# Patient Record
Sex: MALE | Race: BLACK OR AFRICAN AMERICAN | Employment: OTHER | URBAN - METROPOLITAN AREA
[De-identification: names, ages, dates, MRNs, and addresses within clinical notes are randomized per-mention and may not be internally consistent; named-entity substitution may affect disease eponyms.]

---

## 2021-11-05 ENCOUNTER — OFFICE VISIT (OUTPATIENT)
Dept: ONCOLOGY | Age: 61
End: 2021-11-05
Payer: MEDICARE

## 2021-11-05 VITALS
HEART RATE: 81 BPM | DIASTOLIC BLOOD PRESSURE: 93 MMHG | OXYGEN SATURATION: 98 % | BODY MASS INDEX: 22.97 KG/M2 | HEIGHT: 74 IN | TEMPERATURE: 97.3 F | SYSTOLIC BLOOD PRESSURE: 141 MMHG | WEIGHT: 179 LBS | RESPIRATION RATE: 18 BRPM

## 2021-11-05 DIAGNOSIS — D53.9 MACROCYTIC ANEMIA: Primary | ICD-10-CM

## 2021-11-05 DIAGNOSIS — D64.9 CHRONIC ANEMIA: ICD-10-CM

## 2021-11-05 PROCEDURE — 99204 OFFICE O/P NEW MOD 45 MIN: CPT | Performed by: INTERNAL MEDICINE

## 2021-11-05 RX ORDER — LANOLIN ALCOHOL/MO/W.PET/CERES
400 CREAM (GRAM) TOPICAL DAILY
COMMUNITY

## 2021-11-05 NOTE — PROGRESS NOTES
Hematology/Oncology Consultation Note      Date: 2021    Name: Alma Oreilly  : 1960        Estrada Killian MD         Subjective:     Chief complaint: anemia    History of Present Illness:   Mr. Imelda Tellez is a most pleasant 57y.o. year old male who was seen for consultation of anemia. The patient has a past medical history significant of thrombocytopenia and refractory anemia, questionable myelodysplastic syndrome. Patient was being followed by Dr. Betina Brunner who retired, last seen 2016.  2021 CBC reported hemoglobin 10, hematocrit 30.3%, WBC 4.4, , and platelet 032,563. He has been doing reasonably well. The patient otherwise has no other complaints. Denied fever, chills, night sweat, unintentional weight loss, skin lumps or bumps, acute bleeding or bruising issues. Denied headache, acute vision change, dizziness, chest pain, worsen shortness of breath, palpitation, productive cough, nausea, vomiting, abdominal pain, altered bowel habits, dysuria, worsen bone pain or back pain, new focal numbness or weakness. Past Medical History, Family History, and Social History:    Past Medical History:   Diagnosis Date    Anemia     GERD (gastroesophageal reflux disease)     Seizures (HCC)     Stool color black     Thyroid disease      No past surgical history on file. Social History     Socioeconomic History    Marital status:      Spouse name: Not on file    Number of children: Not on file    Years of education: Not on file    Highest education level: Not on file   Occupational History    Not on file   Tobacco Use    Smoking status: Current Some Day Smoker    Smokeless tobacco: Never Used    Tobacco comment: while drinking   Substance and Sexual Activity    Alcohol use:  Yes     Alcohol/week: 4.2 standard drinks     Types: 5 Cans of beer per week     Comment: 4-5 beers a few times a week    Drug use: No    Sexual activity: Not on file   Other Topics Concern    Not on file   Social History Narrative    Not on file     Social Determinants of Health     Financial Resource Strain:     Difficulty of Paying Living Expenses: Not on file   Food Insecurity:     Worried About Running Out of Food in the Last Year: Not on file    Joanne of Food in the Last Year: Not on file   Transportation Needs:     Lack of Transportation (Medical): Not on file    Lack of Transportation (Non-Medical): Not on file   Physical Activity:     Days of Exercise per Week: Not on file    Minutes of Exercise per Session: Not on file   Stress:     Feeling of Stress : Not on file   Social Connections:     Frequency of Communication with Friends and Family: Not on file    Frequency of Social Gatherings with Friends and Family: Not on file    Attends Rastafarian Services: Not on file    Active Member of [address] [place] [address] [employer] or Organizations: Not on file    Attends Club or Organization Meetings: Not on file    Marital Status: Not on file   Intimate Partner Violence:     Fear of Current or Ex-Partner: Not on file    Emotionally Abused: Not on file    Physically Abused: Not on file    Sexually Abused: Not on file   Housing Stability:     Unable to Pay for Housing in the Last Year: Not on file    Number of [place] in the Last Year: Not on file    Unstable Housing in the Last Year: Not on file     No family history on file. Current Outpatient Medications   Medication Sig Dispense Refill    magnesium oxide (MAG-OX) 400 mg tablet Take 400 mg by mouth daily.  levETIRAcetam (KEPPRA) 500 mg tablet Take  by mouth two (2) times a day.  esomeprazole (NEXIUM) 40 mg capsule Take  by mouth daily.  levothyroxine (SYNTHROID) 50 mcg tablet Take  by mouth Daily (before breakfast).  Calcium Carbonate-Vit D3-Min (CALTRATE 600+D PLUS MINERALS) 600 mg calcium- 400 unit tab Take  by mouth. (Patient not taking: Reported on 11/5/2021)      zonisamide (ZONEGRAN) 100 mg capsule Take  by mouth daily.  (Patient not taking: Reported on 11/5/2021)      omega-3 fatty acids-vitamin e (FISH OIL) 1,000 mg cap Take 1 Cap by mouth. (Patient not taking: Reported on 11/5/2021)         Review of Systems   Constitutional: Positive for malaise/fatigue. Negative for chills, diaphoresis, fever and weight loss. Respiratory: Negative for cough, hemoptysis, shortness of breath and wheezing. Cardiovascular: Negative for chest pain, palpitations and leg swelling. Gastrointestinal: Negative for abdominal pain, diarrhea, heartburn, nausea and vomiting. Genitourinary: Negative for dysuria, frequency, hematuria and urgency. Musculoskeletal: Negative for joint pain and myalgias. Skin: Negative for itching and rash. Neurological: Negative for dizziness, seizures, weakness and headaches. Psychiatric/Behavioral: Negative for depression. The patient does not have insomnia. Objective:     Visit Vitals  BP (!) 141/93 (BP 1 Location: Right arm, BP Patient Position: Sitting, BP Cuff Size: Adult)   Pulse 81   Temp 97.3 °F (36.3 °C) (Temporal)   Resp 18   Ht 6' 2\" (1.88 m)   Wt 81.2 kg (179 lb)   SpO2 98%   BMI 22.98 kg/m²       ECOG Performance Status (grade): 1  0 - able to carry on all pre-disease activity w/out restriction  1 - restricted but able to carry out light work  2 - ambulatory and can self- care but unable to carry out work  3 - bed or chair >50% of waking hours  4 - completely disable, total care, confined to bed or chair    Physical Exam  Constitutional:       General: He is not in acute distress. HENT:      Head: Normocephalic and atraumatic. Eyes:      Pupils: Pupils are equal, round, and reactive to light. Cardiovascular:      Pulses: Normal pulses. Heart sounds: Normal heart sounds. No murmur heard. Pulmonary:      Effort: Pulmonary effort is normal. No respiratory distress. Breath sounds: Normal breath sounds. Abdominal:      General: Bowel sounds are normal. There is no distension. Palpations: Abdomen is soft. There is no mass. Tenderness: There is no abdominal tenderness. There is no guarding. Musculoskeletal:         General: No swelling or tenderness. Cervical back: Neck supple. No rigidity. Lymphadenopathy:      Cervical: No cervical adenopathy. Skin:     General: Skin is warm. Findings: No rash. Neurological:      Mental Status: He is alert and oriented to person, place, and time. Mental status is at baseline. Cranial Nerves: No cranial nerve deficit. Psychiatric:         Mood and Affect: Mood normal.          Diagnostics:      No results found for this or any previous visit (from the past 96 hour(s)). Imaging:  No results found for this or any previous visit. No results found for this or any previous visit. No results found for this or any previous visit. Pathology          Assessment:                                        1. Macrocytic anemia    2. Chronic anemia        Plan:                                        # Macrocytic Anemia  # Chronic Anemia   -- Past medical history significant of thrombocytopenia and refractory anemia, questionable myelodysplastic syndrome. -- Patient was being followed by Dr. Feliciano Tolbert who retired, last seen 1/2016.  -- 8/19/2021 CBC reported hemoglobin 10, hematocrit 30.3%, WBC 4.4, , and platelet 533,098.  -- Today I have reviewed with the patient about above lab reports. Plan:  -- Lab checks: CBC, chemistry, iron profiles, ferritin, erythropoietin level, B12, folate, MMA, reticulocyte count, LDH/haptoglobin, smear review, and SPEP/SFLC/SHERMAN.  -- The patient will be notified if any interventions is warranted. Further workup will be guided by results from aforementioned initial study. -- We will see the patient back in about 3 weeks. Always sooner if required.         Orders Placed This Encounter    METABOLIC PANEL, COMPREHENSIVE     Standing Status:   Future     Standing Expiration Date: 11/6/2022    IRON PROFILE     Standing Status:   Future     Standing Expiration Date:   11/6/2022    FERRITIN     Standing Status:   Future     Standing Expiration Date:   11/5/2022    VITAMIN B12 & FOLATE     Standing Status:   Future     Standing Expiration Date:   11/5/2022    RETICULOCYTE COUNT     Standing Status:   Future     Standing Expiration Date:   11/5/2022    ERYTHROPOIETIN     Standing Status:   Future     Standing Expiration Date:   11/5/2022    SED RATE (ESR)     Standing Status:   Future     Standing Expiration Date:   11/5/2022    COPPER     Standing Status:   Future     Standing Expiration Date:   11/5/2022    PERIPHERAL SMEAR     Standing Status:   Future     Standing Expiration Date:   11/5/2022    CBC WITH AUTOMATED DIFF     Standing Status:   Future     Standing Expiration Date:   11/5/2022    LD     Standing Status:   Future     Standing Expiration Date:   11/5/2022    HAPTOGLOBIN     Standing Status:   Future     Standing Expiration Date:   11/5/2022    GAMMOPATHY EVAL, SPEP/SHERMAN, IG QT/FLC     Standing Status:   Future     Standing Expiration Date:   11/5/2022    magnesium oxide (MAG-OX) 400 mg tablet     Sig: Take 400 mg by mouth daily. Mr. Harmeet Pulliam has a reminder for a \"due or due soon\" health maintenance. I have asked that he contact his primary care provider for follow-up on this health maintenance. All of patient's questions answered to their apparent satisfaction. They verbally show understanding and agreement with aforementioned plan. I would like to thank Dr Clifton Clemons MD  for allowing me to participate in the care of this very pleasant patient. If I can be of further assistance please do not hesitate to call.          Elin Montgomery MD  11/5/2021              Above mentioned total time spent for this encounter with more than 50% of the time spent in face-to-face counseling, discussing on diagnosis and management plan going forward, and co-ordination of care. Parts of this document has been produced using Dragon dictation system. Unrecognized errors in transcription may be present. Please do not hesitate to reach out for any questions or clarifications.       CC: Jj Flores MD

## 2021-11-10 LAB
ALBUMIN SERPL ELPH-MCNC: 3.8 G/DL (ref 2.9–4.4)
ALBUMIN SERPL-MCNC: 4.2 G/DL (ref 3.8–4.8)
ALBUMIN/GLOB SERPL: 1 {RATIO} (ref 0.7–1.7)
ALBUMIN/GLOB SERPL: 1.1 {RATIO} (ref 1.2–2.2)
ALP SERPL-CCNC: 109 IU/L (ref 44–121)
ALPHA1 GLOB SERPL ELPH-MCNC: 0.1 G/DL (ref 0–0.4)
ALPHA2 GLOB SERPL ELPH-MCNC: 0.8 G/DL (ref 0.4–1)
ALT SERPL-CCNC: 11 IU/L (ref 0–44)
AST SERPL-CCNC: 24 IU/L (ref 0–40)
B-GLOBULIN SERPL ELPH-MCNC: 1.3 G/DL (ref 0.7–1.3)
BASOPHILS # BLD AUTO: 0.1 X10E3/UL (ref 0–0.2)
BASOPHILS NFR BLD AUTO: 3 %
BILIRUB SERPL-MCNC: 0.3 MG/DL (ref 0–1.2)
BUN SERPL-MCNC: 10 MG/DL (ref 8–27)
BUN/CREAT SERPL: 11 (ref 10–24)
CALCIUM SERPL-MCNC: 9.1 MG/DL (ref 8.6–10.2)
CHLORIDE SERPL-SCNC: 101 MMOL/L (ref 96–106)
CO2 SERPL-SCNC: 18 MMOL/L (ref 20–29)
COPPER SERPL-MCNC: 83 UG/DL (ref 69–132)
CREAT SERPL-MCNC: 0.87 MG/DL (ref 0.76–1.27)
EOSINOPHIL # BLD AUTO: 0.1 X10E3/UL (ref 0–0.4)
EOSINOPHIL NFR BLD AUTO: 3 %
EPO SERPL-ACNC: 12.1 MIU/ML (ref 2.6–18.5)
ERYTHROCYTE [DISTWIDTH] IN BLOOD BY AUTOMATED COUNT: 13.6 % (ref 11.6–15.4)
ERYTHROCYTE [SEDIMENTATION RATE] IN BLOOD BY WESTERGREN METHOD: 36 MM/HR (ref 0–30)
FERRITIN SERPL-MCNC: 131 NG/ML (ref 30–400)
FOLATE SERPL-MCNC: 3.2 NG/ML
GAMMA GLOB SERPL ELPH-MCNC: 2 G/DL (ref 0.4–1.8)
GLOBULIN SER CALC-MCNC: 3.8 G/DL (ref 1.5–4.5)
GLOBULIN SER-MCNC: 4.2 G/DL (ref 2.2–3.9)
GLUCOSE SERPL-MCNC: 94 MG/DL (ref 65–99)
HAPTOGLOB SERPL-MCNC: 139 MG/DL (ref 32–363)
HCT VFR BLD AUTO: 37.4 % (ref 37.5–51)
HGB BLD-MCNC: 13 G/DL (ref 13–17.7)
IGA SERPL-MCNC: 767 MG/DL (ref 61–437)
IGG SERPL-MCNC: 1793 MG/DL (ref 603–1613)
IGM SERPL-MCNC: 61 MG/DL (ref 20–172)
IMM GRANULOCYTES # BLD AUTO: 0 X10E3/UL (ref 0–0.1)
IMM GRANULOCYTES NFR BLD AUTO: 0 %
INTERPRETATION SERPL IEP-IMP: ABNORMAL
IRON SATN MFR SERPL: 38 % (ref 15–55)
IRON SERPL-MCNC: 108 UG/DL (ref 38–169)
KAPPA LC FREE SER-MCNC: 36.9 MG/L (ref 3.3–19.4)
KAPPA LC FREE/LAMBDA FREE SER: 1.54 {RATIO} (ref 0.26–1.65)
LAMBDA LC FREE SERPL-MCNC: 23.9 MG/L (ref 5.7–26.3)
LDH SERPL-CCNC: 145 IU/L (ref 121–224)
LYMPHOCYTES # BLD AUTO: 1.5 X10E3/UL (ref 0.7–3.1)
LYMPHOCYTES NFR BLD AUTO: 52 %
M PROTEIN SERPL ELPH-MCNC: ABNORMAL G/DL
MCH RBC QN AUTO: 32.5 PG (ref 26.6–33)
MCHC RBC AUTO-ENTMCNC: 34.8 G/DL (ref 31.5–35.7)
MCV RBC AUTO: 94 FL (ref 79–97)
MONOCYTES # BLD AUTO: 0.4 X10E3/UL (ref 0.1–0.9)
MONOCYTES NFR BLD AUTO: 15 %
MORPHOLOGY BLD-IMP: ABNORMAL
NEUTROPHILS # BLD AUTO: 0.8 X10E3/UL (ref 1.4–7)
NEUTROPHILS NFR BLD AUTO: 27 %
PATH INTERP BLD-IMP: ABNORMAL
PATH REV BLD -IMP: ABNORMAL
PATHOLOGIST NAME: ABNORMAL
PLATELET # BLD AUTO: 157 X10E3/UL (ref 150–450)
PLEASE NOTE:, 149534: ABNORMAL
POTASSIUM SERPL-SCNC: 4.2 MMOL/L (ref 3.5–5.2)
PROT SERPL-MCNC: 8 G/DL (ref 6–8.5)
RBC # BLD AUTO: 4 X10E6/UL (ref 4.14–5.8)
RETICS/RBC NFR AUTO: 7.2 % (ref 0.6–2.6)
SODIUM SERPL-SCNC: 137 MMOL/L (ref 134–144)
TIBC SERPL-MCNC: 286 UG/DL (ref 250–450)
UIBC SERPL-MCNC: 178 UG/DL (ref 111–343)
VIT B12 SERPL-MCNC: 262 PG/ML (ref 232–1245)
WBC # BLD AUTO: 2.8 X10E3/UL (ref 3.4–10.8)

## 2021-11-30 ENCOUNTER — VIRTUAL VISIT (OUTPATIENT)
Dept: ONCOLOGY | Age: 61
End: 2021-11-30

## 2021-11-30 DIAGNOSIS — D64.9 CHRONIC ANEMIA: ICD-10-CM

## 2021-11-30 DIAGNOSIS — D70.9 NEUTROPENIA, UNSPECIFIED TYPE (HCC): ICD-10-CM

## 2021-11-30 DIAGNOSIS — D53.9 MACROCYTIC ANEMIA: Primary | ICD-10-CM

## 2021-12-08 ENCOUNTER — DOCUMENTATION ONLY (OUTPATIENT)
Dept: ONCOLOGY | Age: 61
End: 2021-12-08

## 2021-12-08 NOTE — PROGRESS NOTES
Patient no showed 3 week virtual appt 11/30/2021 tried contacting patient get fast busy signal.      Patient lvm 12/07/2021 believes he missed phone appt or needs to get correct date call [phone]. Tried calling back [phone] still get fast busy signal even tried using area code and 1+area code. Tried calling emergency contact no vm set up.

## 2022-01-03 ENCOUNTER — VIRTUAL VISIT (OUTPATIENT)
Dept: ONCOLOGY | Age: 62
End: 2022-01-03
Payer: MEDICARE

## 2022-01-03 DIAGNOSIS — E53.8 B12 DEFICIENCY: Primary | ICD-10-CM

## 2022-01-03 DIAGNOSIS — D70.9 NEUTROPENIA, UNSPECIFIED TYPE (HCC): ICD-10-CM

## 2022-01-03 DIAGNOSIS — D53.9 MACROCYTIC ANEMIA: ICD-10-CM

## 2022-01-03 DIAGNOSIS — D64.9 CHRONIC ANEMIA: ICD-10-CM

## 2022-01-03 PROCEDURE — 99442 PR PHYS/QHP TELEPHONE EVALUATION 11-20 MIN: CPT | Performed by: INTERNAL MEDICINE

## 2022-01-03 NOTE — PROGRESS NOTES
Sarah Kraus is a 57 y.o. male, evaluated via audio-only technology on 1/3/2022 for Follow-up  . Assessment & Plan:   Diagnoses and all orders for this visit:    1. B12 deficiency  -     METABOLIC PANEL, COMPREHENSIVE; Future  -     VITAMIN B12 & FOLATE; Future  -     CBC WITH AUTOMATED DIFF; Future  -     METHYLMALONIC ACID; Future    2. Macrocytic anemia    3. Chronic anemia    4. Neutropenia, unspecified type ([address]  -     METABOLIC PANEL, COMPREHENSIVE; Future  -     VITAMIN B12 & FOLATE; Future  -     CBC WITH AUTOMATED DIFF; Future  -     METHYLMALONIC ACID; Future      # B12 deficiency anemia  # Macrocytic Anemia  # Chronic Anemia   # Neutropenia  -- Past medical history significant of thrombocytopenia and refractory anemia, questionable myelodysplastic syndrome. -- Patient was being followed by Dr. Verónica Coburn who retired, last seen 1/2016.  -- 8/19/2021 CBC reported hemoglobin 10, hematocrit 30.3%, WBC 4.4, , and platelet 740,467.  -- Today I have reviewed with the patient about above lab reports. 11/5/2021 CBC reported hemoglobin 13, hematocrit 37.4%, total WBC 2.8, ANC 0.8, platelet 256,096. ESR was 36, vitamin B12 262, , haptoglobin 139, iron saturation 38%, ferritin 131. We have discussed about potential BMBx if no clear etiology of cytopenia, he reported he has transportation issues to [facility] for BMBx procedures. We have discussed about B12 replacement, the patient was agreeable with the plan. Plan:  -- His neutropenia and macrocytic anemia could be related to B12 deficiency. Will plan to start him on Vit B12 1000 mcg injection once weekly for one month followed by 1000 mcg monthly. -- Continue lab checks CBC with diff, B12, folate, MMA, reticulocyte count. -- If persistent cytopenias will consider to obtain BMBx  -- Advised the patient to seek a prompt medical attention if any fever, recurrent infections, skin rashes, or any concerns.    -- We will see the patient back in about 3 months. Always sooner if required. 12  Subjective:   Mr. Evaristo Johnson is a most pleasant 57y.o. year old male who was seen for consultation of anemia. The patient has a past medical history significant of thrombocytopenia and refractory anemia, questionable myelodysplastic syndrome. Patient was being followed by Dr. Ronnie Lilly who retired, last seen 1/2016.  8/19/2021 CBC reported hemoglobin 10, hematocrit 30.3%, WBC 4.4, , and platelet 395,788. Since last visit he reported he has been doing reasonably well. The patient otherwise has no other complaints. Denied fever, chills, night sweat, unintentional weight loss, skin lumps or bumps, acute bleeding or bruising issues. Denied headache, acute vision change, dizziness, chest pain, worsen shortness of breath, palpitation, productive cough, nausea, vomiting, abdominal pain, altered bowel habits, dysuria, worsen bone pain or back pain, new focal numbness or weakness. Prior to Admission medications    Medication Sig Start Date End Date Taking? Authorizing Provider   magnesium oxide (MAG-OX) 400 mg tablet Take 400 mg by mouth daily. Provider, Historical   Calcium Carbonate-Vit D3-Min (CALTRATE 600+D PLUS MINERALS) 600 mg calcium- 400 unit tab Take  by mouth. Patient not taking: Reported on 11/5/2021    Provider, Historical   zonisamide (ZONEGRAN) 100 mg capsule Take  by mouth daily. Patient not taking: Reported on 11/5/2021    Provider, Historical   levETIRAcetam (KEPPRA) 500 mg tablet Take  by mouth two (2) times a day. Provider, Historical   esomeprazole (NEXIUM) 40 mg capsule Take  by mouth daily. Provider, Historical   levothyroxine (SYNTHROID) 50 mcg tablet Take  by mouth Daily (before breakfast). Provider, Historical   omega-3 fatty acids-vitamin e (FISH OIL) 1,000 mg cap Take 1 Cap by mouth.   Patient not taking: Reported on 11/5/2021    Provider, Historical     Patient Active Problem List   Diagnosis Code    Refractory anemia ([facility] D46.4    MDS (myelodysplastic syndrome) ([facility]) D46.9       Review of Systems   Constitutional: Negative for chills, diaphoresis, fever, malaise/fatigue and weight loss. Respiratory: Negative for cough, hemoptysis, shortness of breath and wheezing. Cardiovascular: Negative for chest pain, palpitations and leg swelling. Gastrointestinal: Negative for abdominal pain, diarrhea, heartburn, nausea and vomiting. Genitourinary: Negative for dysuria, frequency, hematuria and urgency. Musculoskeletal: Negative for joint pain and myalgias. Skin: Negative for itching and rash. Neurological: Negative for dizziness, seizures, weakness and headaches. Psychiatric/Behavioral: Negative for depression. The patient does not have insomnia. Patient-Reported Vitals 1/3/2022   Patient-Reported Weight 178lbs       Lars Lowery, who was evaluated through a patient-initiated, synchronous (real-time) audio only encounter, and/or her healthcare decision maker, is aware that it is a billable service, with coverage as determined by his insurance carrier. He provided verbal consent to proceed: Yes. He has not had a related appointment within my department in the past 7 days or scheduled within the next 24 hours. On this date 01/03/2022 I have spent 20 minutes reviewing previous notes, test results and face to face (virtual) with the patient discussing the diagnosis and importance of compliance with the treatment plan as well as documenting on the day of the visit.     Erich Greene MD

## 2022-02-07 PROBLEM — D51.9 VITAMIN B12 DEFICIENCY ANEMIA: Status: ACTIVE | Noted: 2022-02-07

## 2022-02-07 RX ORDER — HYDROCORTISONE SODIUM SUCCINATE 100 MG/2ML
100 INJECTION, POWDER, FOR SOLUTION INTRAMUSCULAR; INTRAVENOUS AS NEEDED
Status: CANCELLED | OUTPATIENT
Start: 2022-02-11

## 2022-02-07 RX ORDER — DIPHENHYDRAMINE HYDROCHLORIDE 50 MG/ML
50 INJECTION, SOLUTION INTRAMUSCULAR; INTRAVENOUS AS NEEDED
Status: CANCELLED
Start: 2022-02-11

## 2022-02-07 RX ORDER — ACETAMINOPHEN 325 MG/1
650 TABLET ORAL AS NEEDED
Status: CANCELLED
Start: 2022-02-11

## 2022-02-07 RX ORDER — EPINEPHRINE 1 MG/ML
0.3 INJECTION, SOLUTION, CONCENTRATE INTRAVENOUS AS NEEDED
Status: CANCELLED | OUTPATIENT
Start: 2022-02-11

## 2022-02-07 RX ORDER — ALBUTEROL SULFATE 0.83 MG/ML
2.5 SOLUTION RESPIRATORY (INHALATION) AS NEEDED
Status: CANCELLED
Start: 2022-02-11

## 2022-02-07 RX ORDER — DIPHENHYDRAMINE HYDROCHLORIDE 50 MG/ML
25 INJECTION, SOLUTION INTRAMUSCULAR; INTRAVENOUS AS NEEDED
Status: CANCELLED
Start: 2022-02-11

## 2022-02-07 RX ORDER — ONDANSETRON 2 MG/ML
8 INJECTION INTRAMUSCULAR; INTRAVENOUS AS NEEDED
Status: CANCELLED | OUTPATIENT
Start: 2022-02-11

## 2022-02-11 ENCOUNTER — HOSPITAL ENCOUNTER (OUTPATIENT)
Dept: INFUSION THERAPY | Age: 62
Discharge: HOME | End: 2022-02-11
Payer: MEDICARE

## 2022-02-11 VITALS
TEMPERATURE: 97.8 F | OXYGEN SATURATION: 98 % | SYSTOLIC BLOOD PRESSURE: 126 MMHG | DIASTOLIC BLOOD PRESSURE: 84 MMHG | RESPIRATION RATE: 16 BRPM | HEART RATE: 77 BPM

## 2022-02-11 DIAGNOSIS — D51.9 ANEMIA DUE TO VITAMIN B12 DEFICIENCY, UNSPECIFIED B12 DEFICIENCY TYPE: Primary | ICD-10-CM

## 2022-02-11 PROCEDURE — 74011250636 HC RX REV CODE- 250/636: Performed by: INTERNAL MEDICINE

## 2022-02-11 PROCEDURE — 96372 THER/PROPH/DIAG INJ SC/IM: CPT

## 2022-02-11 RX ORDER — CYANOCOBALAMIN 1000 UG/ML
1000 INJECTION, SOLUTION INTRAMUSCULAR; SUBCUTANEOUS ONCE
Status: CANCELLED
Start: 2022-03-11 | End: 2022-03-11

## 2022-02-11 RX ORDER — HYDROCORTISONE SODIUM SUCCINATE 100 MG/2ML
100 INJECTION, POWDER, FOR SOLUTION INTRAMUSCULAR; INTRAVENOUS AS NEEDED
Status: CANCELLED | OUTPATIENT
Start: 2022-03-11

## 2022-02-11 RX ORDER — ONDANSETRON 2 MG/ML
8 INJECTION INTRAMUSCULAR; INTRAVENOUS AS NEEDED
Status: CANCELLED | OUTPATIENT
Start: 2022-03-11

## 2022-02-11 RX ORDER — ALBUTEROL SULFATE 0.83 MG/ML
2.5 SOLUTION RESPIRATORY (INHALATION) AS NEEDED
Status: CANCELLED
Start: 2022-03-11

## 2022-02-11 RX ORDER — DIPHENHYDRAMINE HYDROCHLORIDE 50 MG/ML
25 INJECTION, SOLUTION INTRAMUSCULAR; INTRAVENOUS AS NEEDED
Status: CANCELLED
Start: 2022-03-11

## 2022-02-11 RX ORDER — DIPHENHYDRAMINE HYDROCHLORIDE 50 MG/ML
50 INJECTION, SOLUTION INTRAMUSCULAR; INTRAVENOUS AS NEEDED
Status: CANCELLED
Start: 2022-03-11

## 2022-02-11 RX ORDER — CYANOCOBALAMIN 1000 UG/ML
1000 INJECTION, SOLUTION INTRAMUSCULAR; SUBCUTANEOUS ONCE
Status: COMPLETED | OUTPATIENT
Start: 2022-02-11 | End: 2022-02-11

## 2022-02-11 RX ORDER — EPINEPHRINE 1 MG/ML
0.3 INJECTION, SOLUTION, CONCENTRATE INTRAVENOUS AS NEEDED
Status: CANCELLED | OUTPATIENT
Start: 2022-03-11

## 2022-02-11 RX ORDER — ACETAMINOPHEN 325 MG/1
650 TABLET ORAL AS NEEDED
Status: CANCELLED
Start: 2022-03-11

## 2022-02-11 RX ADMIN — CYANOCOBALAMIN 1000 MCG: 1000 INJECTION INTRAMUSCULAR; SUBCUTANEOUS at 10:39

## 2022-02-11 NOTE — PROGRESS NOTES
[facility] Progress Note    Date: 2022    Name: Edison Sherwood    MRN: [id number]         : 1960      Mr. Rena Gandhi arrived to [facility] at [phone], here for Vitamin B12 injection (once weekly for one month, then just monthly). Mr. Rena Gandhi was assessed and both verbal and written education was provided. Pt stated he had received B12 injection years ago without any issues. Mr. Rafia Potter vitals were reviewed. Visit Vitals  /84 (BP 1 Location: Left upper arm, BP Patient Position: Sitting)   Pulse 77   Temp 97.8 °F (36.6 °C)   Resp 16   SpO2 98%       Cyanocobalamin (Vitamin B12) 1,000 mcg was administered as ordered SQ in patient's left upper arm. Bandaid to site. Mr. Rena Gandhi tolerated well without complaints. Discharge/ follow-up instructions discussed w/ pt. Pt verbalized understanding. Pt armband removed & shredded. Mr. Rena Gandhi was discharged from [place] [address] in stable condition at 1050. He is to return on 22 at 1330 for his next appointment.     Jeremy Mcgraw RN  2022

## 2022-02-14 RX ORDER — CYANOCOBALAMIN 1000 UG/ML
1000 INJECTION, SOLUTION INTRAMUSCULAR; SUBCUTANEOUS ONCE
Status: CANCELLED
Start: 2022-02-18 | End: 2022-02-18

## 2022-02-18 ENCOUNTER — HOSPITAL ENCOUNTER (OUTPATIENT)
Dept: INFUSION THERAPY | Age: 62
Discharge: HOME | End: 2022-02-18
Payer: MEDICARE

## 2022-02-18 VITALS
RESPIRATION RATE: 16 BRPM | TEMPERATURE: 98 F | DIASTOLIC BLOOD PRESSURE: 72 MMHG | OXYGEN SATURATION: 98 % | SYSTOLIC BLOOD PRESSURE: 115 MMHG | HEART RATE: 91 BPM

## 2022-02-18 DIAGNOSIS — D51.9 ANEMIA DUE TO VITAMIN B12 DEFICIENCY, UNSPECIFIED B12 DEFICIENCY TYPE: Primary | ICD-10-CM

## 2022-02-18 PROCEDURE — 96372 THER/PROPH/DIAG INJ SC/IM: CPT

## 2022-02-18 PROCEDURE — 74011250636 HC RX REV CODE- 250/636: Performed by: INTERNAL MEDICINE

## 2022-02-18 RX ORDER — CYANOCOBALAMIN 1000 UG/ML
1000 INJECTION, SOLUTION INTRAMUSCULAR; SUBCUTANEOUS ONCE
Status: COMPLETED | OUTPATIENT
Start: 2022-02-18 | End: 2022-02-18

## 2022-02-18 RX ORDER — HYDROCORTISONE SODIUM SUCCINATE 100 MG/2ML
100 INJECTION, POWDER, FOR SOLUTION INTRAMUSCULAR; INTRAVENOUS AS NEEDED
Status: CANCELLED | OUTPATIENT
Start: 2022-02-25

## 2022-02-18 RX ORDER — DIPHENHYDRAMINE HYDROCHLORIDE 50 MG/ML
50 INJECTION, SOLUTION INTRAMUSCULAR; INTRAVENOUS AS NEEDED
Status: CANCELLED
Start: 2022-02-25

## 2022-02-18 RX ORDER — ACETAMINOPHEN 325 MG/1
650 TABLET ORAL AS NEEDED
Status: CANCELLED
Start: 2022-02-25

## 2022-02-18 RX ORDER — EPINEPHRINE 1 MG/ML
0.3 INJECTION, SOLUTION, CONCENTRATE INTRAVENOUS AS NEEDED
Status: CANCELLED | OUTPATIENT
Start: 2022-02-25

## 2022-02-18 RX ORDER — CYANOCOBALAMIN 1000 UG/ML
1000 INJECTION, SOLUTION INTRAMUSCULAR; SUBCUTANEOUS ONCE
Status: CANCELLED
Start: 2022-02-25 | End: 2022-02-25

## 2022-02-18 RX ORDER — ALBUTEROL SULFATE 0.83 MG/ML
2.5 SOLUTION RESPIRATORY (INHALATION) AS NEEDED
Status: CANCELLED
Start: 2022-02-25

## 2022-02-18 RX ORDER — ONDANSETRON 2 MG/ML
8 INJECTION INTRAMUSCULAR; INTRAVENOUS AS NEEDED
Status: CANCELLED | OUTPATIENT
Start: 2022-02-25

## 2022-02-18 RX ORDER — DIPHENHYDRAMINE HYDROCHLORIDE 50 MG/ML
25 INJECTION, SOLUTION INTRAMUSCULAR; INTRAVENOUS AS NEEDED
Status: CANCELLED
Start: 2022-02-25

## 2022-02-18 RX ADMIN — CYANOCOBALAMIN 1000 MCG: 1000 INJECTION, SOLUTION INTRAMUSCULAR at 14:13

## 2022-02-18 NOTE — PROGRESS NOTES
[facility] OPI Progress Note    Date: 2022    Name: Taylor North    MRN: [id number]         : 1960       B12 INJECTION      Mr. Kuldeep Bennett arrived to [facility] at 0. Mr. Kuldeep Bennett was assessed and education was provided. Mr. Britton Caballero vitals were reviewed. Visit Vitals  /72 (BP 1 Location: Left upper arm, BP Patient Position: Sitting)   Pulse 91   Temp 98 °F (36.7 °C)   Resp 16   SpO2 98%       B12 1000mcg was administered as ordered SQ in patient's L upper arm. Mr. Kuldeep Bennett tolerated well without complaints. Band-aid applied to site. Discharge/ follow-up instructions discussed w/ pt. Pt verbalized understanding. Pt armband removed & shredded. Mr. Kuldeep Bennett was discharged from [place] [address] in stable condition at 1410. He is to return on 22 at 1400 for his next appointment.     Jc Huynh RN  2022

## 2022-02-18 NOTE — PROGRESS NOTES
[address] February 18, 2022       To Whom It May Concern,    This is to certify that Mrs. Denise Ahumada was required to provide transportation for her spouse to a medical appointment. Please feel free to contact my office if you have any questions or concerns. Thank you for your assistance in this matter.       Sincerely,        Lester James

## 2022-02-25 ENCOUNTER — HOSPITAL ENCOUNTER (OUTPATIENT)
Dept: INFUSION THERAPY | Age: 62
Discharge: HOME | End: 2022-02-25
Payer: MEDICARE

## 2022-02-25 VITALS
SYSTOLIC BLOOD PRESSURE: 129 MMHG | HEART RATE: 89 BPM | DIASTOLIC BLOOD PRESSURE: 78 MMHG | OXYGEN SATURATION: 97 % | TEMPERATURE: 98.1 F | RESPIRATION RATE: 16 BRPM

## 2022-02-25 DIAGNOSIS — D51.9 ANEMIA DUE TO VITAMIN B12 DEFICIENCY, UNSPECIFIED B12 DEFICIENCY TYPE: Primary | ICD-10-CM

## 2022-02-25 PROCEDURE — 74011250636 HC RX REV CODE- 250/636: Performed by: INTERNAL MEDICINE

## 2022-02-25 PROCEDURE — 96372 THER/PROPH/DIAG INJ SC/IM: CPT

## 2022-02-25 RX ORDER — DIPHENHYDRAMINE HYDROCHLORIDE 50 MG/ML
25 INJECTION, SOLUTION INTRAMUSCULAR; INTRAVENOUS AS NEEDED
Status: CANCELLED
Start: 2022-03-04

## 2022-02-25 RX ORDER — HYDROCORTISONE SODIUM SUCCINATE 100 MG/2ML
100 INJECTION, POWDER, FOR SOLUTION INTRAMUSCULAR; INTRAVENOUS AS NEEDED
Status: CANCELLED | OUTPATIENT
Start: 2022-03-04

## 2022-02-25 RX ORDER — DIPHENHYDRAMINE HYDROCHLORIDE 50 MG/ML
50 INJECTION, SOLUTION INTRAMUSCULAR; INTRAVENOUS AS NEEDED
Status: CANCELLED
Start: 2022-03-04

## 2022-02-25 RX ORDER — CYANOCOBALAMIN 1000 UG/ML
1000 INJECTION, SOLUTION INTRAMUSCULAR; SUBCUTANEOUS ONCE
Status: COMPLETED | OUTPATIENT
Start: 2022-02-25 | End: 2022-02-25

## 2022-02-25 RX ORDER — EPINEPHRINE 1 MG/ML
0.3 INJECTION, SOLUTION, CONCENTRATE INTRAVENOUS AS NEEDED
Status: CANCELLED | OUTPATIENT
Start: 2022-03-04

## 2022-02-25 RX ORDER — CYANOCOBALAMIN 1000 UG/ML
1000 INJECTION, SOLUTION INTRAMUSCULAR; SUBCUTANEOUS ONCE
Status: CANCELLED
Start: 2022-03-04 | End: 2022-03-04

## 2022-02-25 RX ORDER — ALBUTEROL SULFATE 0.83 MG/ML
2.5 SOLUTION RESPIRATORY (INHALATION) AS NEEDED
Status: CANCELLED
Start: 2022-03-04

## 2022-02-25 RX ORDER — ACETAMINOPHEN 325 MG/1
650 TABLET ORAL AS NEEDED
Status: CANCELLED
Start: 2022-03-04

## 2022-02-25 RX ORDER — ONDANSETRON 2 MG/ML
8 INJECTION INTRAMUSCULAR; INTRAVENOUS AS NEEDED
Status: CANCELLED | OUTPATIENT
Start: 2022-03-04

## 2022-02-25 RX ADMIN — CYANOCOBALAMIN 1000 MCG: 1000 INJECTION, SOLUTION INTRAMUSCULAR at 14:27

## 2022-02-25 NOTE — PROGRESS NOTES
[facility] Progress Note    Date: 2022    Name: Bola Fall    MRN: [id number]         : 1960       B12 INJECTION      Mr. Maris Wallace arrived to [facility] at 1410. Mr. Maris Wallace was assessed and education was provided. Mr. Marion Bergman vitals were reviewed. Visit Vitals  /78 (BP 1 Location: Left upper arm, BP Patient Position: Sitting)   Pulse 89   Temp 98.1 °F (36.7 °C)   Resp 16   SpO2 97%       B12 1000mcg was administered as ordered SQ in patient's LT upper arm. Band-aid applied to site. Mr. Maris Wallace tolerated well without complaints. Discharge/ follow-up instructions discussed w/ pt. Pt verbalized understanding. Pt armband removed & shredded. Mr. Maris Wallace was discharged from [place] [address] in stable condition at 1430. He is to return on 3/4/22 at 1430 for his next B-12 injection appointment.     Isis Miller RN  2022

## 2022-02-25 NOTE — PROGRESS NOTES
[address] February 25, 2022       To Whom It May Concern,    This is to certify that Mrs. Niles Reynolds was required to provide transportation for her spouse to a medical appointment. Please feel free to contact my office if you have any questions or concerns. Thank you for your assistance in this matter.       Sincerely,        Ana Monzon RN  [phone]

## 2022-03-11 ENCOUNTER — APPOINTMENT (OUTPATIENT)
Dept: INFUSION THERAPY | Age: 62
End: 2022-03-11
Payer: MEDICARE

## 2022-03-18 ENCOUNTER — HOSPITAL ENCOUNTER (OUTPATIENT)
Dept: INFUSION THERAPY | Age: 62
Discharge: HOME | End: 2022-03-18
Payer: MEDICARE

## 2022-03-18 VITALS
RESPIRATION RATE: 16 BRPM | TEMPERATURE: 97.7 F | DIASTOLIC BLOOD PRESSURE: 84 MMHG | HEART RATE: 89 BPM | OXYGEN SATURATION: 99 % | SYSTOLIC BLOOD PRESSURE: 137 MMHG

## 2022-03-18 DIAGNOSIS — D51.9 ANEMIA DUE TO VITAMIN B12 DEFICIENCY, UNSPECIFIED B12 DEFICIENCY TYPE: Primary | ICD-10-CM

## 2022-03-18 PROCEDURE — 74011250636 HC RX REV CODE- 250/636: Performed by: INTERNAL MEDICINE

## 2022-03-18 PROCEDURE — 96372 THER/PROPH/DIAG INJ SC/IM: CPT

## 2022-03-18 RX ORDER — ACETAMINOPHEN 325 MG/1
650 TABLET ORAL AS NEEDED
Status: CANCELLED
Start: 2022-04-01

## 2022-03-18 RX ORDER — DIPHENHYDRAMINE HYDROCHLORIDE 50 MG/ML
25 INJECTION, SOLUTION INTRAMUSCULAR; INTRAVENOUS AS NEEDED
Status: CANCELLED
Start: 2022-04-01

## 2022-03-18 RX ORDER — EPINEPHRINE 1 MG/ML
0.3 INJECTION, SOLUTION, CONCENTRATE INTRAVENOUS AS NEEDED
Status: CANCELLED | OUTPATIENT
Start: 2022-04-01

## 2022-03-18 RX ORDER — ALBUTEROL SULFATE 0.83 MG/ML
2.5 SOLUTION RESPIRATORY (INHALATION) AS NEEDED
Status: CANCELLED
Start: 2022-04-01

## 2022-03-18 RX ORDER — CYANOCOBALAMIN 1000 UG/ML
1000 INJECTION, SOLUTION INTRAMUSCULAR; SUBCUTANEOUS ONCE
Status: COMPLETED | OUTPATIENT
Start: 2022-03-18 | End: 2022-03-18

## 2022-03-18 RX ORDER — ONDANSETRON 2 MG/ML
8 INJECTION INTRAMUSCULAR; INTRAVENOUS AS NEEDED
Status: CANCELLED | OUTPATIENT
Start: 2022-04-01

## 2022-03-18 RX ORDER — CYANOCOBALAMIN 1000 UG/ML
1000 INJECTION, SOLUTION INTRAMUSCULAR; SUBCUTANEOUS ONCE
Status: CANCELLED
Start: 2022-04-01 | End: 2022-04-01

## 2022-03-18 RX ORDER — DIPHENHYDRAMINE HYDROCHLORIDE 50 MG/ML
50 INJECTION, SOLUTION INTRAMUSCULAR; INTRAVENOUS AS NEEDED
Status: CANCELLED
Start: 2022-04-01

## 2022-03-18 RX ORDER — HYDROCORTISONE SODIUM SUCCINATE 100 MG/2ML
100 INJECTION, POWDER, FOR SOLUTION INTRAMUSCULAR; INTRAVENOUS AS NEEDED
Status: CANCELLED | OUTPATIENT
Start: 2022-04-01

## 2022-03-18 RX ADMIN — CYANOCOBALAMIN 1000 MCG: 1000 INJECTION, SOLUTION INTRAMUSCULAR at 14:11

## 2022-03-18 NOTE — PROGRESS NOTES
[facility] Progress Note    Date: 2022    Name: Cecilia Hicks    MRN: [id number]         : 1960       B12 INJECTION      Mr. Margarette Manzano arrived to [facility] ambulatory at 1400. Mr. Margarette Manzano was assessed and education was provided. Mr. Geraldo Polo vitals were reviewed. Visit Vitals  /84 (BP 1 Location: Left upper arm, BP Patient Position: Sitting)   Pulse 89   Temp 97.7 °F (36.5 °C)   Resp 16   SpO2 99%       B12 1000mcg was administered as ordered SQ in patient's LT upper arm. Band-aid applied to site. Mr. Margarette Manzano tolerated well without complaints. Discharge/ follow-up instructions discussed w/ pt. Pt verbalized understanding. Pt armband removed & shredded. Mr. Margarette Manzano was discharged from [place] [address] in stable condition at [id number] [phone]. He is to return on 4/15/22 at 1430 for his next B-12 injection appointment.     Harjeet Mays RN  2022

## 2022-03-19 PROBLEM — D51.9 VITAMIN B12 DEFICIENCY ANEMIA: Status: ACTIVE | Noted: 2022-02-07

## 2022-03-24 LAB
A-G RATIO,AGRAT: 1.2 RATIO (ref 1.1–2.6)
ABSOLUTE LYMPHOCYTE COUNT, 10803: 1.8 K/UL (ref 1–4.8)
ALBUMIN SERPL-MCNC: 4 G/DL (ref 3.5–5)
ALP SERPL-CCNC: 134 U/L (ref 40–125)
ALT SERPL-CCNC: 15 U/L (ref 5–40)
ANION GAP SERPL CALC-SCNC: 10 MMOL/L (ref 3–15)
AST SERPL W P-5'-P-CCNC: 19 U/L (ref 10–37)
BASOPHILS # BLD: 0.1 K/UL (ref 0–0.2)
BASOPHILS NFR BLD: 1 % (ref 0–2)
BILIRUB SERPL-MCNC: 0.3 MG/DL (ref 0.2–1.2)
BUN SERPL-MCNC: 12 MG/DL (ref 6–22)
CALCIUM SERPL-MCNC: 8.9 MG/DL (ref 8.4–10.5)
CHLORIDE SERPL-SCNC: 103 MMOL/L (ref 98–110)
CO2 SERPL-SCNC: 25 MMOL/L (ref 20–32)
CREAT SERPL-MCNC: 0.8 MG/DL (ref 0.8–1.6)
EOSINOPHIL # BLD: 0.2 K/UL (ref 0–0.5)
EOSINOPHIL NFR BLD: 4 % (ref 0–6)
ERYTHROCYTE [DISTWIDTH] IN BLOOD BY AUTOMATED COUNT: 13.2 % (ref 10–15.5)
FOLATE,FOL: 10.5 NG/ML
GFRAA, 66117: >60
GFRNA, 66118: >60
GLOBULIN,GLOB: 3.4 G/DL (ref 2–4)
GLUCOSE SERPL-MCNC: 101 MG/DL (ref 70–99)
GRANULOCYTES,GRANS: 43 % (ref 40–75)
HCT VFR BLD AUTO: 36 % (ref 39.3–51.6)
HGB BLD-MCNC: 12.3 G/DL (ref 13.1–17.2)
LYMPHOCYTES, LYMLT: 39 % (ref 20–45)
MCH RBC QN AUTO: 33 PG (ref 26–34)
MCHC RBC AUTO-ENTMCNC: 34 G/DL (ref 31–36)
MCV RBC AUTO: 98 FL (ref 80–95)
MONOCYTES # BLD: 0.6 K/UL (ref 0.1–1)
MONOCYTES NFR BLD: 13 % (ref 3–12)
NEUTROPHILS # BLD AUTO: 1.9 K/UL (ref 1.8–7.7)
PLATELET # BLD AUTO: 175 K/UL (ref 140–440)
PMV BLD AUTO: 10.4 FL (ref 9–13)
POTASSIUM SERPL-SCNC: 3.4 MMOL/L (ref 3.5–5.5)
PROT SERPL-MCNC: 7.4 G/DL (ref 6.2–8.1)
RBC # BLD AUTO: 3.68 M/UL (ref 3.8–5.8)
SODIUM SERPL-SCNC: 138 MMOL/L (ref 133–145)
VIT B12 SERPL-MCNC: 865 PG/ML (ref 211–911)
WBC # BLD AUTO: 4.5 K/UL (ref 4–11)

## 2022-03-27 LAB — METHYLMALONIC ACID: 164 NMOL/L (ref 0–378)

## 2022-04-05 ENCOUNTER — VIRTUAL VISIT (OUTPATIENT)
Dept: ONCOLOGY | Age: 62
End: 2022-04-05
Payer: MEDICARE

## 2022-04-05 DIAGNOSIS — D53.9 MACROCYTIC ANEMIA: ICD-10-CM

## 2022-04-05 DIAGNOSIS — D64.9 CHRONIC ANEMIA: ICD-10-CM

## 2022-04-05 DIAGNOSIS — E53.8 B12 DEFICIENCY: Primary | ICD-10-CM

## 2022-04-05 DIAGNOSIS — D70.9 NEUTROPENIA, UNSPECIFIED TYPE (HCC): ICD-10-CM

## 2022-04-05 PROCEDURE — 99442 PR PHYS/QHP TELEPHONE EVALUATION 11-20 MIN: CPT | Performed by: INTERNAL MEDICINE

## 2022-04-05 RX ORDER — POTASSIUM CHLORIDE 600 MG/1
TABLET, FILM COATED, EXTENDED RELEASE ORAL
COMMUNITY
Start: 2022-03-29

## 2022-04-05 NOTE — PROGRESS NOTES
Lauren Singh is a 57 y.o. male, evaluated via audio-only technology on 4/5/2022 for No chief complaint on file. .    Assessment & Plan:   Diagnoses and all orders for this visit:    1. B12 deficiency    2. Macrocytic anemia    3. Neutropenia, unspecified type ([address]    4. Chronic anemia      # B12 deficiency anemia  # Macrocytic Anemia/ Chronic Anemia   # Neutropenia  -- Past medical history significant of thrombocytopenia and refractory anemia, questionable myelodysplastic syndrome. -- Patient was being followed previously by Dr. Mamie Hannah who retired, last seen 1/2016.  -- 8/19/2021 CBC reported hemoglobin 10, hematocrit 30.3%, WBC 4.4, , and platelet 058,397.  -- 11/5/2021 CBC reported hemoglobin 13, hematocrit 37.4%, total WBC 2.8, ANC 0.8, platelet 164,331. ESR was 36, vitamin B12 262, , haptoglobin 139, iron saturation 38%, ferritin 131. SPEP/SHERMAN reported Polyclonal increase detected in one or more immunoglobulins. -- At last visit we have discussed about potential BMBx if no clear etiology of cytopenia, he was reluactant for procedure. He reported he has transportation issues to [facility] for BMBx procedures. -- His neutropenia and macrocytic anemia could be related to B12 deficiency. He was started on Vit B12 1000 mcg injection once weekly for one month followed by 1000 mcg monthly. -- Today I have reviewed with the patient about recent lab reports. 3/24/2022 WBC/ANC improved 4.5/1.9; H/H 12.3/36, MCV 98. Vit B12 865, Folate 10.5, . Plan:  -- He will continue Vit B12 1000 mcg injection monthly. -- Continue lab checks CBC with diff, B12, folate, MMA, reticulocyte count. -- If persistent or worsening cytopenias will consider to obtain BMBx  -- Advised the patient to seek a prompt medical attention if any fever, recurrent infections, skin rashes, or any concerns. -- We will see the patient back in about 4 months. Always sooner if required.     12  Subjective:   Mr. Aleah Vega is a most pleasant 57y.o. year old male who was seen for consultation of anemia. The patient has a past medical history significant of thrombocytopenia and refractory anemia, questionable myelodysplastic syndrome. Patient was being followed by Dr. Sandra Pruett who retired, last seen 1/2016. Since last visit he reported he has been doing reasonably well. He has been on B12 injection without any issues. The patient otherwise has no other complaints. Denied fever, chills, night sweat, unintentional weight loss, skin lumps or bumps, acute bleeding or bruising issues. Denied headache, acute vision change, dizziness, chest pain, worsen shortness of breath, palpitation, productive cough, nausea, vomiting, abdominal pain, altered bowel habits, dysuria, worsen bone pain or back pain, new focal numbness or weakness. Prior to Admission medications    Medication Sig Start Date End Date Taking? Authorizing Provider   magnesium oxide (MAG-OX) 400 mg tablet Take 400 mg by mouth daily. Provider, Historical   Calcium Carbonate-Vit D3-Min (CALTRATE 600+D PLUS MINERALS) 600 mg calcium- 400 unit tab Take  by mouth. Patient not taking: Reported on 11/5/2021    Provider, Historical   zonisamide (ZONEGRAN) 100 mg capsule Take  by mouth daily. Patient not taking: Reported on 11/5/2021    Provider, Historical   levETIRAcetam (KEPPRA) 500 mg tablet Take  by mouth two (2) times a day. Provider, Historical   esomeprazole (NEXIUM) 40 mg capsule Take  by mouth daily. Provider, Historical   levothyroxine (SYNTHROID) 50 mcg tablet Take  by mouth Daily (before breakfast). Provider, Historical   omega-3 fatty acids-vitamin e (FISH OIL) 1,000 mg cap Take 1 Cap by mouth.   Patient not taking: Reported on 11/5/2021    Provider, Historical     Patient Active Problem List   Diagnosis Code    Refractory anemia (HCC) D46.4    MDS (myelodysplastic syndrome) ([facility] [address] D46.9    Vitamin B12 deficiency anemia D51.9       Review of Systems Constitutional: Negative for chills, diaphoresis, fever, malaise/fatigue and weight loss. Respiratory: Negative for cough, hemoptysis, shortness of breath and wheezing. Cardiovascular: Negative for chest pain, palpitations and leg swelling. Gastrointestinal: Negative for abdominal pain, diarrhea, heartburn, nausea and vomiting. Genitourinary: Negative for dysuria, frequency, hematuria and urgency. Musculoskeletal: Negative for joint pain and myalgias. Skin: Negative for itching and rash. Neurological: Negative for dizziness, seizures, weakness and headaches. Psychiatric/Behavioral: Negative for depression. The patient does not have insomnia. Patient-Reported Vitals 1/3/2022   Patient-Reported Weight 178lbs       Mack Corona, who was evaluated through a patient-initiated, synchronous (real-time) audio only encounter, and/or her healthcare decision maker, is aware that it is a billable service, with coverage as determined by his insurance carrier. He provided verbal consent to proceed: Yes. He has not had a related appointment within my department in the past 7 days or scheduled within the next 24 hours. On this date 04/05/2022 I have spent 20 minutes reviewing previous notes, test results and face to face (virtual) with the patient discussing the diagnosis and importance of compliance with the treatment plan as well as documenting on the day of the visit.     Leena Singleton MD       CC: Lilian Freeman MD

## 2022-04-15 ENCOUNTER — HOSPITAL ENCOUNTER (OUTPATIENT)
Dept: INFUSION THERAPY | Age: 62
Discharge: HOME | End: 2022-04-15
Payer: MEDICARE

## 2022-04-15 VITALS
DIASTOLIC BLOOD PRESSURE: 87 MMHG | SYSTOLIC BLOOD PRESSURE: 138 MMHG | HEART RATE: 71 BPM | OXYGEN SATURATION: 97 % | TEMPERATURE: 97.9 F | RESPIRATION RATE: 16 BRPM

## 2022-04-15 DIAGNOSIS — D51.9 ANEMIA DUE TO VITAMIN B12 DEFICIENCY, UNSPECIFIED B12 DEFICIENCY TYPE: Primary | ICD-10-CM

## 2022-04-15 PROCEDURE — 96372 THER/PROPH/DIAG INJ SC/IM: CPT

## 2022-04-15 PROCEDURE — 74011250636 HC RX REV CODE- 250/636: Performed by: INTERNAL MEDICINE

## 2022-04-15 RX ORDER — CYANOCOBALAMIN 1000 UG/ML
1000 INJECTION, SOLUTION INTRAMUSCULAR; SUBCUTANEOUS ONCE
Status: COMPLETED | OUTPATIENT
Start: 2022-04-15 | End: 2022-04-15

## 2022-04-15 RX ORDER — ACETAMINOPHEN 325 MG/1
650 TABLET ORAL AS NEEDED
Status: CANCELLED
Start: 2022-05-13

## 2022-04-15 RX ORDER — HYDROCORTISONE SODIUM SUCCINATE 100 MG/2ML
100 INJECTION, POWDER, FOR SOLUTION INTRAMUSCULAR; INTRAVENOUS AS NEEDED
Status: CANCELLED | OUTPATIENT
Start: 2022-05-13

## 2022-04-15 RX ORDER — ALBUTEROL SULFATE 0.83 MG/ML
2.5 SOLUTION RESPIRATORY (INHALATION) AS NEEDED
Status: CANCELLED
Start: 2022-05-13

## 2022-04-15 RX ORDER — DIPHENHYDRAMINE HYDROCHLORIDE 50 MG/ML
25 INJECTION, SOLUTION INTRAMUSCULAR; INTRAVENOUS AS NEEDED
Status: CANCELLED
Start: 2022-05-13

## 2022-04-15 RX ORDER — EPINEPHRINE 1 MG/ML
0.3 INJECTION, SOLUTION, CONCENTRATE INTRAVENOUS AS NEEDED
Status: CANCELLED | OUTPATIENT
Start: 2022-05-13

## 2022-04-15 RX ORDER — DIPHENHYDRAMINE HYDROCHLORIDE 50 MG/ML
50 INJECTION, SOLUTION INTRAMUSCULAR; INTRAVENOUS AS NEEDED
Status: CANCELLED
Start: 2022-05-13

## 2022-04-15 RX ORDER — ONDANSETRON 2 MG/ML
8 INJECTION INTRAMUSCULAR; INTRAVENOUS AS NEEDED
Status: CANCELLED | OUTPATIENT
Start: 2022-05-13

## 2022-04-15 RX ORDER — CYANOCOBALAMIN 1000 UG/ML
1000 INJECTION, SOLUTION INTRAMUSCULAR; SUBCUTANEOUS ONCE
Status: CANCELLED
Start: 2022-05-13 | End: 2022-05-13

## 2022-04-15 RX ADMIN — CYANOCOBALAMIN 1000 MCG: 1000 INJECTION, SOLUTION INTRAMUSCULAR at 14:48

## 2022-04-15 NOTE — PROGRESS NOTES
[facility] Progress Note    Date: April 15, 2022    Name: Lisa Cuellar    MRN: [id number]         : 1960       B12 INJECTION      Mr. Allan Kendrick arrived to [facility] ambulatory at 1435. Patient complained of slight dizziness earlier today. Verbalized he may not be drinking enough fluids. Patient encouraged to follow up with his PCP and patient verbalized he would. Mr. Allan Kendrick was assessed and education was provided. Mr. Marietta Tilley vitals were reviewed. Visit Vitals  /87 (BP 1 Location: Left upper arm, BP Patient Position: Sitting)   Pulse 71   Temp 97.9 °F (36.6 °C)   Resp 16   SpO2 97%       B12 1000mcg was administered as ordered SQ in patient's LT upper arm. Band-aid applied to site. Mr. Allan Kendrick tolerated well without complaints. Discharge/ follow-up instructions discussed w/ pt. Pt verbalized understanding. Pt armband removed & shredded. Mr. Allan Kenrdick was discharged from [place] [address] in stable condition at 1450. He is to return on 22 at 1430 for his next B-12 injection appointment.       Lyn Charles  April 15, 2022

## 2022-04-15 NOTE — PROGRESS NOTES
[address] April 15, 2022       To Whom It May Concern,    This is to certify that Mrs. Hector Coreas was required to provide transportation for her spouse to a medical appointment. Please feel free to contact my office if you have any questions or concerns. Thank you for your assistance in this matter.       Sincerely,        Bautista Curran  [phone]

## 2022-04-25 NOTE — ADDENDUM NOTE
Encounter addended by: Chiqui Marquez RN on: 4/25/2022 3:20 PM   Actions taken: Charge Capture section accepted

## 2022-05-13 ENCOUNTER — HOSPITAL ENCOUNTER (OUTPATIENT)
Dept: INFUSION THERAPY | Age: 62
Discharge: HOME | End: 2022-05-13
Payer: MEDICARE

## 2022-05-13 VITALS
RESPIRATION RATE: 16 BRPM | TEMPERATURE: 98.1 F | OXYGEN SATURATION: 98 % | HEART RATE: 78 BPM | SYSTOLIC BLOOD PRESSURE: 156 MMHG | DIASTOLIC BLOOD PRESSURE: 91 MMHG

## 2022-05-13 DIAGNOSIS — D51.9 ANEMIA DUE TO VITAMIN B12 DEFICIENCY, UNSPECIFIED B12 DEFICIENCY TYPE: Primary | ICD-10-CM

## 2022-05-13 PROCEDURE — 96372 THER/PROPH/DIAG INJ SC/IM: CPT

## 2022-05-13 PROCEDURE — 74011250636 HC RX REV CODE- 250/636: Performed by: INTERNAL MEDICINE

## 2022-05-13 RX ORDER — EPINEPHRINE 1 MG/ML
0.3 INJECTION, SOLUTION, CONCENTRATE INTRAVENOUS AS NEEDED
Status: CANCELLED | OUTPATIENT
Start: 2022-06-10

## 2022-05-13 RX ORDER — CYANOCOBALAMIN 1000 UG/ML
1000 INJECTION, SOLUTION INTRAMUSCULAR; SUBCUTANEOUS ONCE
Status: CANCELLED
Start: 2022-06-10 | End: 2022-06-10

## 2022-05-13 RX ORDER — ONDANSETRON 2 MG/ML
8 INJECTION INTRAMUSCULAR; INTRAVENOUS AS NEEDED
Status: CANCELLED | OUTPATIENT
Start: 2022-06-10

## 2022-05-13 RX ORDER — HYDROCORTISONE SODIUM SUCCINATE 100 MG/2ML
100 INJECTION, POWDER, FOR SOLUTION INTRAMUSCULAR; INTRAVENOUS AS NEEDED
Status: CANCELLED | OUTPATIENT
Start: 2022-06-10

## 2022-05-13 RX ORDER — CYANOCOBALAMIN 1000 UG/ML
1000 INJECTION, SOLUTION INTRAMUSCULAR; SUBCUTANEOUS ONCE
Status: COMPLETED | OUTPATIENT
Start: 2022-05-13 | End: 2022-05-13

## 2022-05-13 RX ORDER — DIPHENHYDRAMINE HYDROCHLORIDE 50 MG/ML
50 INJECTION, SOLUTION INTRAMUSCULAR; INTRAVENOUS AS NEEDED
Status: CANCELLED
Start: 2022-06-10

## 2022-05-13 RX ORDER — DIPHENHYDRAMINE HYDROCHLORIDE 50 MG/ML
25 INJECTION, SOLUTION INTRAMUSCULAR; INTRAVENOUS AS NEEDED
Status: CANCELLED
Start: 2022-06-10

## 2022-05-13 RX ORDER — ALBUTEROL SULFATE 0.83 MG/ML
2.5 SOLUTION RESPIRATORY (INHALATION) AS NEEDED
Status: CANCELLED
Start: 2022-06-10

## 2022-05-13 RX ORDER — ACETAMINOPHEN 325 MG/1
650 TABLET ORAL AS NEEDED
Status: CANCELLED
Start: 2022-06-10

## 2022-05-13 RX ADMIN — CYANOCOBALAMIN 1000 MCG: 1000 INJECTION, SOLUTION INTRAMUSCULAR at 14:28

## 2022-05-13 NOTE — PROGRESS NOTES
[facility] Progress Note    Date: May 13, 2022    Name: Sandee Handy    MRN: [id number]         : 1960    Mr. Darlynn Paget arrived in the [facility] today at 46, in stable condition, here for his B-12 Injection (Every 4 Weeks). He was assessed and education was provided. Mr. Dillon Quigley vitals were reviewed. Visit Vitals  BP (!) 156/91 (BP 1 Location: Left upper arm, BP Patient Position: Sitting)   Pulse 78   Temp 98.1 °F (36.7 °C)   Resp 16   SpO2 98%           Cyanocobalamin (Vitamin B-12) 1,000 mcg, was administered SQ, in the back of his LEFT arm at 1428, per order and without incident. Mr. Darlynn Paget tolerated well and voiced no complaints. Mr. Darlynn Paget was discharged from [place] [address] in stable condition at 1435. He is to return in 4 weeks, on Friday, 6-10-22 at 1430, for his next appointment, for his next B-12 Injection.      Shar Duval RN  May 13, 2022  2:26 PM

## 2022-05-13 NOTE — PROGRESS NOTES
[address] [address]  Phone # [phone]                      Date: May 13, 2022    Name: Lexx Giordano    MRN: [id number]         : 1960    Mr. Jannet Sweeney had an appointment in our infusion center today, and his wife, Mrs. Nati Brown had to provide transportation to this appointment. Please excuse her from work for any missed time, due this matter. Please feel free to contact our office with any questions or concerns. Thank you in advance for your cooperation.        Sincerely,      Amparo Rico RN  May 13, 2022  2:32 PM

## 2022-06-10 ENCOUNTER — HOSPITAL ENCOUNTER (OUTPATIENT)
Dept: INFUSION THERAPY | Age: 62
End: 2022-06-10

## 2022-06-21 ENCOUNTER — HOSPITAL ENCOUNTER (OUTPATIENT)
Dept: INFUSION THERAPY | Age: 62
Discharge: HOME | End: 2022-06-21
Payer: MEDICARE

## 2022-06-21 VITALS
RESPIRATION RATE: 16 BRPM | HEART RATE: 73 BPM | TEMPERATURE: 98 F | DIASTOLIC BLOOD PRESSURE: 88 MMHG | SYSTOLIC BLOOD PRESSURE: 143 MMHG | OXYGEN SATURATION: 98 %

## 2022-06-21 DIAGNOSIS — D51.9 ANEMIA DUE TO VITAMIN B12 DEFICIENCY, UNSPECIFIED B12 DEFICIENCY TYPE: Primary | ICD-10-CM

## 2022-06-21 PROCEDURE — 96372 THER/PROPH/DIAG INJ SC/IM: CPT

## 2022-06-21 PROCEDURE — 74011250636 HC RX REV CODE- 250/636: Performed by: INTERNAL MEDICINE

## 2022-06-21 RX ORDER — ACETAMINOPHEN 325 MG/1
650 TABLET ORAL AS NEEDED
Status: CANCELLED
Start: 2022-07-19

## 2022-06-21 RX ORDER — CYANOCOBALAMIN 1000 UG/ML
1000 INJECTION, SOLUTION INTRAMUSCULAR; SUBCUTANEOUS ONCE
Status: CANCELLED
Start: 2022-07-19 | End: 2022-07-19

## 2022-06-21 RX ORDER — DIPHENHYDRAMINE HYDROCHLORIDE 50 MG/ML
50 INJECTION, SOLUTION INTRAMUSCULAR; INTRAVENOUS AS NEEDED
Status: CANCELLED
Start: 2022-07-19

## 2022-06-21 RX ORDER — ALBUTEROL SULFATE 0.83 MG/ML
2.5 SOLUTION RESPIRATORY (INHALATION) AS NEEDED
Status: CANCELLED
Start: 2022-07-19

## 2022-06-21 RX ORDER — CYANOCOBALAMIN 1000 UG/ML
1000 INJECTION, SOLUTION INTRAMUSCULAR; SUBCUTANEOUS ONCE
Status: COMPLETED | OUTPATIENT
Start: 2022-06-21 | End: 2022-06-21

## 2022-06-21 RX ORDER — HYDROCORTISONE SODIUM SUCCINATE 100 MG/2ML
100 INJECTION, POWDER, FOR SOLUTION INTRAMUSCULAR; INTRAVENOUS AS NEEDED
Status: CANCELLED | OUTPATIENT
Start: 2022-07-19

## 2022-06-21 RX ORDER — EPINEPHRINE 1 MG/ML
0.3 INJECTION, SOLUTION, CONCENTRATE INTRAVENOUS AS NEEDED
Status: CANCELLED | OUTPATIENT
Start: 2022-07-19

## 2022-06-21 RX ORDER — ONDANSETRON 2 MG/ML
8 INJECTION INTRAMUSCULAR; INTRAVENOUS AS NEEDED
Status: CANCELLED | OUTPATIENT
Start: 2022-07-19

## 2022-06-21 RX ORDER — DIPHENHYDRAMINE HYDROCHLORIDE 50 MG/ML
25 INJECTION, SOLUTION INTRAMUSCULAR; INTRAVENOUS AS NEEDED
Status: CANCELLED
Start: 2022-07-19

## 2022-06-21 RX ADMIN — CYANOCOBALAMIN 1000 MCG: 1000 INJECTION, SOLUTION INTRAMUSCULAR at 13:58

## 2022-06-21 NOTE — PROGRESS NOTES
[facility] Progress Note    Date: 2022    Name: Adolfo Yost    MRN: [id number]         : 1960       B12 INJECTION Q28 DAYS      Mr. Mamie Chaudhry arrived to [facility] at 1350. Mr. Mamie Chaudhry was assessed and education was provided. Mr. Rina Sierra vitals were reviewed. Visit Vitals  BP (!) 143/88 (BP 1 Location: Left upper arm, BP Patient Position: Sitting)   Pulse 73   Temp 98 °F (36.7 °C)   Resp 16   SpO2 98%       B12 1000mcg was administered as ordered SQ in patient's L upper arm. Mr. Mamie Chaudhry tolerated well without complaints. Band-aid applied to site. Discharge/ follow-up instructions discussed w/ pt. Pt verbalized understanding. Pt armband removed & shredded. Mr. Mamie Chaudhry was discharged from [place] [address] in stable condition at 1400. He is to return on 22 at 1330 for his next appointment.     Claudia Ma RN  2022

## 2022-07-08 ENCOUNTER — APPOINTMENT (OUTPATIENT)
Dept: INFUSION THERAPY | Age: 62
End: 2022-07-08
Payer: MEDICARE

## 2022-07-19 ENCOUNTER — HOSPITAL ENCOUNTER (OUTPATIENT)
Dept: INFUSION THERAPY | Age: 62
End: 2022-07-19

## 2022-07-19 RX ORDER — CYANOCOBALAMIN 1000 UG/ML
1000 INJECTION, SOLUTION INTRAMUSCULAR; SUBCUTANEOUS ONCE
Status: CANCELLED
Start: 2022-07-26 | End: 2022-07-26

## 2022-07-26 ENCOUNTER — LAB ONLY (OUTPATIENT)
Dept: ONCOLOGY | Age: 62
End: 2022-07-26

## 2022-07-26 ENCOUNTER — HOSPITAL ENCOUNTER (OUTPATIENT)
Dept: INFUSION THERAPY | Age: 62
End: 2022-07-26

## 2022-07-26 DIAGNOSIS — E53.8 B12 DEFICIENCY: ICD-10-CM

## 2022-07-26 DIAGNOSIS — D64.9 CHRONIC ANEMIA: ICD-10-CM

## 2022-07-26 DIAGNOSIS — D53.9 MACROCYTIC ANEMIA: Primary | ICD-10-CM

## 2022-07-26 DIAGNOSIS — D70.9 NEUTROPENIA, UNSPECIFIED TYPE (HCC): ICD-10-CM

## 2022-08-02 RX ORDER — CYANOCOBALAMIN 1000 UG/ML
1000 INJECTION, SOLUTION INTRAMUSCULAR; SUBCUTANEOUS ONCE
Status: CANCELLED
Start: 2022-08-09 | End: 2022-08-09

## 2022-08-03 ENCOUNTER — APPOINTMENT (OUTPATIENT)
Dept: ONCOLOGY | Age: 62
End: 2022-08-03

## 2022-08-06 LAB
ALBUMIN SERPL-MCNC: 3.9 G/DL (ref 3.8–4.8)
ALBUMIN/GLOB SERPL: 1.1 {RATIO} (ref 1.2–2.2)
ALP SERPL-CCNC: 126 IU/L (ref 44–121)
ALT SERPL-CCNC: 142 IU/L (ref 0–44)
AST SERPL-CCNC: 89 IU/L (ref 0–40)
BASOPHILS # BLD AUTO: 0.1 X10E3/UL (ref 0–0.2)
BASOPHILS NFR BLD AUTO: 2 %
BILIRUB SERPL-MCNC: 0.7 MG/DL (ref 0–1.2)
BUN SERPL-MCNC: 7 MG/DL (ref 8–27)
BUN/CREAT SERPL: 8 (ref 10–24)
CALCIUM SERPL-MCNC: 8.7 MG/DL (ref 8.6–10.2)
CHLORIDE SERPL-SCNC: 103 MMOL/L (ref 96–106)
CO2 SERPL-SCNC: 25 MMOL/L (ref 20–29)
CREAT SERPL-MCNC: 0.93 MG/DL (ref 0.76–1.27)
EGFR: 92 ML/MIN/1.73
EOSINOPHIL # BLD AUTO: 0.2 X10E3/UL (ref 0–0.4)
EOSINOPHIL NFR BLD AUTO: 5 %
ERYTHROCYTE [DISTWIDTH] IN BLOOD BY AUTOMATED COUNT: 13.5 % (ref 11.6–15.4)
FOLATE SERPL-MCNC: 2.8 NG/ML
GLOBULIN SER CALC-MCNC: 3.6 G/DL (ref 1.5–4.5)
GLUCOSE SERPL-MCNC: 94 MG/DL (ref 65–99)
HCT VFR BLD AUTO: 39 % (ref 37.5–51)
HGB BLD-MCNC: 13.2 G/DL (ref 13–17.7)
IMM GRANULOCYTES # BLD AUTO: 0 X10E3/UL (ref 0–0.1)
IMM GRANULOCYTES NFR BLD AUTO: 0 %
LYMPHOCYTES # BLD AUTO: 1.9 X10E3/UL (ref 0.7–3.1)
LYMPHOCYTES NFR BLD AUTO: 56 %
MCH RBC QN AUTO: 34.5 PG (ref 26.6–33)
MCHC RBC AUTO-ENTMCNC: 33.8 G/DL (ref 31.5–35.7)
MCV RBC AUTO: 102 FL (ref 79–97)
METHYLMALONATE SERPL-SCNC: 66 NMOL/L (ref 0–378)
MONOCYTES # BLD AUTO: 0.4 X10E3/UL (ref 0.1–0.9)
MONOCYTES NFR BLD AUTO: 11 %
MORPHOLOGY BLD-IMP: ABNORMAL
NEUTROPHILS # BLD AUTO: 0.9 X10E3/UL (ref 1.4–7)
NEUTROPHILS NFR BLD AUTO: 26 %
PLATELET # BLD AUTO: 127 X10E3/UL (ref 150–450)
POTASSIUM SERPL-SCNC: 3.9 MMOL/L (ref 3.5–5.2)
PROT SERPL-MCNC: 7.5 G/DL (ref 6–8.5)
RBC # BLD AUTO: 3.83 X10E6/UL (ref 4.14–5.8)
RETICS/RBC NFR AUTO: 1.2 % (ref 0.6–2.6)
SODIUM SERPL-SCNC: 144 MMOL/L (ref 134–144)
VIT B12 SERPL-MCNC: 1263 PG/ML (ref 232–1245)
WBC # BLD AUTO: 3.3 X10E3/UL (ref 3.4–10.8)

## 2022-08-09 ENCOUNTER — VIRTUAL VISIT (OUTPATIENT)
Dept: ONCOLOGY | Age: 62
End: 2022-08-09
Payer: MEDICARE

## 2022-08-09 DIAGNOSIS — E53.8 B12 DEFICIENCY: Primary | ICD-10-CM

## 2022-08-09 PROCEDURE — 99442 PR PHYS/QHP TELEPHONE EVALUATION 11-20 MIN: CPT | Performed by: NURSE PRACTITIONER

## 2022-08-09 RX ORDER — CHLORHEXIDINE GLUCONATE 1.2 MG/ML
RINSE ORAL
COMMUNITY
Start: 2022-07-05

## 2022-08-09 NOTE — PROGRESS NOTES
Dorota Harry is a 60 y.o. male, evaluated via audio-only technology on 2022 for Follow-up    PCP: Saravanan Dumont MD    Assessment & Plan:   B12 deficiency anemia  Macrocytic Anemia/ Chronic Anemia   Neutropenia  --Past medical history significant of thrombocytopenia and refractory anemia, questionable myelodysplastic syndrome. --Patient was being followed previously by Dr. Shazia Cassidy who retired, last seen 2016.  --2021 CBC reported hemoglobin 10, hematocrit 30.3%, WBC 4.4, , and platelet 400,975.  --2021 CBC reported hemoglobin 13, hematocrit 37.4%, total WBC 2.8, ANC 0.8, platelet 194,554. ESR was 36, vitamin B12 262, , haptoglobin 139, iron saturation 38%, ferritin 131. SPEP/SHERMAN reported Polyclonal increase detected in one or more immunoglobulins. --At last visit we have discussed about potential BMBx if no clear etiology of cytopenia, he was reluactant for procedure. He reported he has transportation issues to [facility] for BMBx procedures. --His neutropenia and macrocytic anemia could be related to B12 deficiency. He was started on Vit B12 1000 mcg injection once weekly for one month followed by 1000 mcg monthly. --Today I have reviewed with the patient about recent lab reports.   --2022 WBC/ANC 3.3/0.9; H/H 13.2/39, . Vit B12 1263, Folate 2.8, MMA 66. Plan:  --We will hold Vit B12 this month. Recheck labs next month if Vit B12 is normal then will resume B12 injections. --If persistent or worsening cytopenias will consider to obtain BMBx  --Advised the patient to seek a prompt medical attention if any fevers, recurrent infections, skin rashes, or any concerns. 12  Subjective:   Mr. Ilya Faria is a most pleasant 57y.o. year old male who was evaluated via audio-only technology. He has a past medical history significant of thrombocytopenia and refractory anemia, questionable myelodysplastic syndrome.  Patient was being followed by Dr. Shazia Cassidy who retired, last seen 1/2016. Since last visit he reported he has been doing reasonably well. He has been on B12 injection without any issues. The patient otherwise has no other complaints. Denies fevers, chills, night sweats, unintentional weight loss, skin lumps or bumps, acute bleeding or bruising issues. Denies headaches, acute vision changes, dizziness, chest pain, shortness of breath, palpitation, productive cough, nausea, vomiting, abdominal pain, altered bowel habits, dysuria, bone pain or back pain, new focal numbness or weakness. He does not have any concerns or complaints to report at this time. Prior to Admission medications    Medication Sig Start Date End Date Taking? Authorizing Provider   chlorhexidine (PERIDEX) 0.12 % solution  7/5/22   Provider, Historical   potassium chloride SR (KLOR-CON 8) 8 mEq tablet  3/29/22   Provider, Historical   magnesium oxide (MAG-OX) 400 mg tablet Take 400 mg by mouth daily. Provider, Historical   Calcium Carbonate-Vit D3-Min (CALTRATE 600+D PLUS MINERALS) 600 mg calcium- 400 unit tab Take  by mouth. Patient not taking: Reported on 11/5/2021    Provider, Historical   zonisamide (ZONEGRAN) 100 mg capsule Take  by mouth daily. Patient not taking: Reported on 11/5/2021    Provider, Historical   levETIRAcetam (KEPPRA) 500 mg tablet Take  by mouth two (2) times a day. Provider, Historical   esomeprazole (NEXIUM) 40 mg capsule Take  by mouth daily. Provider, Historical   levothyroxine (SYNTHROID) 50 mcg tablet Take  by mouth Daily (before breakfast). Provider, Historical   omega-3 fatty acids-vitamin e (FISH OIL) 1,000 mg cap Take 1 Cap by mouth.   Patient not taking: Reported on 11/5/2021    Provider, Historical     Patient Active Problem List   Diagnosis Code    Refractory anemia (HCC) D46.4    MDS (myelodysplastic syndrome) ([facility] [address] D46.9    Vitamin B12 deficiency anemia D51.9       Review of Systems   Constitutional:  Negative for chills, diaphoresis, fever, malaise/fatigue and weight loss. Respiratory:  Negative for cough, hemoptysis, shortness of breath and wheezing. Cardiovascular:  Negative for chest pain, palpitations and leg swelling. Gastrointestinal:  Negative for abdominal pain, diarrhea, heartburn, nausea and vomiting. Genitourinary:  Negative for dysuria, frequency, hematuria and urgency. Musculoskeletal:  Negative for joint pain and myalgias. Skin:  Negative for itching and rash. Neurological:  Negative for dizziness, seizures, weakness and headaches. Psychiatric/Behavioral:  Negative for depression. The patient does not have insomnia. Patient-Reported Vitals 4/5/2022   Patient-Reported Weight 183lbs   Patient-Reported Pulse 78   Patient-Reported Systolic  618   Patient-Reported Diastolic 91       Richard Willingham, who was evaluated through a patient-initiated, synchronous (real-time) audio only encounter, and/or her healthcare decision maker, is aware that it is a billable service, with coverage as determined by his insurance carrier. He provided verbal consent to proceed: Yes. He has not had a related appointment within my department in the past 7 days or scheduled within the next 24 hours. TOTAL TIME: 19 minutes    Follow up in 3 months with repeat labs or sooner if indicated.     Orders Placed This Encounter    CBC WITH AUTOMATED DIFF     Standing Status:   Future     Standing Expiration Date:   1/17/2768    METABOLIC PANEL, COMPREHENSIVE     Standing Status:   Future     Standing Expiration Date:   8/10/2023    VITAMIN B12 & FOLATE     Standing Status:   Future     Standing Expiration Date:   8/10/2023        Nneka Solis DNP, FNP-C  08/09/22      CC: Aleksandra Daniel MD

## 2022-08-19 ENCOUNTER — HOSPITAL ENCOUNTER (OUTPATIENT)
Dept: INFUSION THERAPY | Age: 62
End: 2022-08-19

## 2022-08-23 ENCOUNTER — APPOINTMENT (OUTPATIENT)
Dept: INFUSION THERAPY | Age: 62
End: 2022-08-23

## 2022-09-16 ENCOUNTER — HOSPITAL ENCOUNTER (OUTPATIENT)
Dept: INFUSION THERAPY | Age: 62
End: 2022-09-16

## 2022-09-23 ENCOUNTER — HOSPITAL ENCOUNTER (OUTPATIENT)
Dept: INFUSION THERAPY | Age: 62
Discharge: HOME | End: 2022-09-23
Payer: MEDICARE

## 2022-09-23 ENCOUNTER — APPOINTMENT (OUTPATIENT)
Dept: ONCOLOGY | Age: 62
End: 2022-09-23

## 2022-09-23 VITALS
DIASTOLIC BLOOD PRESSURE: 82 MMHG | OXYGEN SATURATION: 98 % | RESPIRATION RATE: 16 BRPM | HEART RATE: 72 BPM | TEMPERATURE: 97.4 F | SYSTOLIC BLOOD PRESSURE: 135 MMHG

## 2022-09-23 DIAGNOSIS — D51.9 ANEMIA DUE TO VITAMIN B12 DEFICIENCY, UNSPECIFIED B12 DEFICIENCY TYPE: Primary | ICD-10-CM

## 2022-09-23 PROCEDURE — 74011250636 HC RX REV CODE- 250/636: Performed by: INTERNAL MEDICINE

## 2022-09-23 PROCEDURE — 96372 THER/PROPH/DIAG INJ SC/IM: CPT

## 2022-09-23 RX ORDER — HYDROCORTISONE SODIUM SUCCINATE 100 MG/2ML
100 INJECTION, POWDER, FOR SOLUTION INTRAMUSCULAR; INTRAVENOUS AS NEEDED
OUTPATIENT
Start: 2022-10-21

## 2022-09-23 RX ORDER — ACETAMINOPHEN 325 MG/1
650 TABLET ORAL AS NEEDED
Start: 2022-10-21

## 2022-09-23 RX ORDER — CYANOCOBALAMIN 1000 UG/ML
1000 INJECTION, SOLUTION INTRAMUSCULAR; SUBCUTANEOUS ONCE
Status: CANCELLED
Start: 2022-10-21 | End: 2022-10-21

## 2022-09-23 RX ORDER — CYANOCOBALAMIN 1000 UG/ML
1000 INJECTION, SOLUTION INTRAMUSCULAR; SUBCUTANEOUS ONCE
Status: COMPLETED | OUTPATIENT
Start: 2022-09-23 | End: 2022-09-23

## 2022-09-23 RX ORDER — EPINEPHRINE 1 MG/ML
0.3 INJECTION, SOLUTION, CONCENTRATE INTRAVENOUS AS NEEDED
OUTPATIENT
Start: 2022-10-21

## 2022-09-23 RX ORDER — ALBUTEROL SULFATE 0.83 MG/ML
2.5 SOLUTION RESPIRATORY (INHALATION) AS NEEDED
Start: 2022-10-21

## 2022-09-23 RX ORDER — ONDANSETRON 2 MG/ML
8 INJECTION INTRAMUSCULAR; INTRAVENOUS AS NEEDED
OUTPATIENT
Start: 2022-10-21

## 2022-09-23 RX ORDER — DIPHENHYDRAMINE HYDROCHLORIDE 50 MG/ML
25 INJECTION, SOLUTION INTRAMUSCULAR; INTRAVENOUS AS NEEDED
Start: 2022-10-21

## 2022-09-23 RX ORDER — DIPHENHYDRAMINE HYDROCHLORIDE 50 MG/ML
50 INJECTION, SOLUTION INTRAMUSCULAR; INTRAVENOUS AS NEEDED
Start: 2022-10-21

## 2022-09-23 RX ADMIN — CYANOCOBALAMIN 1000 MCG: 1000 INJECTION, SOLUTION INTRAMUSCULAR at 11:01

## 2022-09-23 NOTE — PROGRESS NOTES
[address] [address]  Phone # [phone]                      Date: 2022    Name: Orest Lanes    MRN: [id number]         : 1960    Mr. Amaris Hughes had an appointment in our infusion center today, and his wife, Mrs. Parvez Vazquez had to provide transportation to this appointment. Please excuse her from work for any missed time, due this matter. Please feel free to contact our office with any questions or concerns. Thank you in advance for your cooperation.        Sincerely,  Jacey Rao RN  2022

## 2022-09-23 NOTE — PROGRESS NOTES
[facility] Progress Note    Date: 2022    Name: Neema Chaudhary    MRN: [id number]         : 1960       B12 INJECTION Q28 DAYS    Mr. Julio César Valdes arrived to [place] at 1. Mr. Julio César Valdes was assessed and education was provided. Mr. Ivy Veronica vitals were reviewed. Visit Vitals  /82 (BP 1 Location: Left upper arm, BP Patient Position: Sitting)   Pulse 72   Temp 97.4 °F (36.3 °C)   Resp 16   SpO2 98%       B12 1000mcg was administered as ordered SQ in patient's L upper arm. Mr. Julio César Valdes tolerated well without complaints. Band-aid applied to site. Discharge/ follow-up instructions discussed w/ pt. Pt verbalized understanding. Pt armband removed & shredded. Mr. Julio César Valdes was discharged from [place] [address] in stable condition at 1105. He is to return on 10/27/22 at 1100 for his next appointment.     Venancio Palacios RN  2022

## 2022-09-26 DIAGNOSIS — E53.8 B12 DEFICIENCY: Primary | ICD-10-CM

## 2022-09-26 DIAGNOSIS — D53.9 MACROCYTIC ANEMIA: ICD-10-CM

## 2022-09-27 LAB
ALBUMIN SERPL-MCNC: 4.2 G/DL (ref 3.8–4.8)
ALBUMIN/GLOB SERPL: 1.4 {RATIO} (ref 1.2–2.2)
ALP SERPL-CCNC: 135 IU/L (ref 44–121)
ALT SERPL-CCNC: 16 IU/L (ref 0–44)
AST SERPL-CCNC: 24 IU/L (ref 0–40)
BILIRUB SERPL-MCNC: 0.2 MG/DL (ref 0–1.2)
BUN SERPL-MCNC: 14 MG/DL (ref 8–27)
BUN/CREAT SERPL: 19 (ref 10–24)
CALCIUM SERPL-MCNC: 9.3 MG/DL (ref 8.6–10.2)
CHLORIDE SERPL-SCNC: 103 MMOL/L (ref 96–106)
CO2 SERPL-SCNC: 25 MMOL/L (ref 20–29)
CREAT SERPL-MCNC: 0.73 MG/DL (ref 0.76–1.27)
EGFR: 102 ML/MIN/1.73
GLOBULIN SER CALC-MCNC: 2.9 G/DL (ref 1.5–4.5)
GLUCOSE SERPL-MCNC: 94 MG/DL (ref 70–99)
POTASSIUM SERPL-SCNC: 4 MMOL/L (ref 3.5–5.2)
PROT SERPL-MCNC: 7.1 G/DL (ref 6–8.5)
SODIUM SERPL-SCNC: 137 MMOL/L (ref 134–144)

## 2022-10-14 ENCOUNTER — APPOINTMENT (OUTPATIENT)
Dept: INFUSION THERAPY | Age: 62
End: 2022-10-14
Payer: MEDICARE

## 2022-10-21 LAB
A-G RATIO,AGRAT: 0.8 RATIO (ref 1.1–2.6)
A-G RATIO,AGRAT: 0.9 RATIO (ref 1.1–2.6)
ABSOLUTE LYMPHOCYTE COUNT, 10803: 1.9 K/UL (ref 1–4.8)
ALBUMIN SERPL-MCNC: 3.3 G/DL (ref 3.5–5)
ALBUMIN SERPL-MCNC: 3.4 G/DL (ref 3.5–5)
ALP SERPL-CCNC: 148 U/L (ref 40–125)
ALP SERPL-CCNC: 150 U/L (ref 40–125)
ALT SERPL-CCNC: 101 U/L (ref 5–40)
ALT SERPL-CCNC: 102 U/L (ref 5–40)
ANION GAP SERPL CALC-SCNC: 8 MMOL/L (ref 3–15)
ANION GAP SERPL CALC-SCNC: 9 MMOL/L (ref 3–15)
AST SERPL W P-5'-P-CCNC: 66 U/L (ref 10–37)
AST SERPL W P-5'-P-CCNC: 67 U/L (ref 10–37)
BASOPHILS # BLD: 0.1 K/UL (ref 0–0.2)
BASOPHILS NFR BLD: 1 % (ref 0–2)
BILIRUB SERPL-MCNC: 0.7 MG/DL (ref 0.2–1.2)
BILIRUB SERPL-MCNC: 0.7 MG/DL (ref 0.2–1.2)
BILIRUBIN, DIRECT,CBIL: 0.2 MG/DL (ref 0–0.3)
BUN SERPL-MCNC: 11 MG/DL (ref 6–22)
BUN SERPL-MCNC: 11 MG/DL (ref 6–22)
CALCIUM SERPL-MCNC: 8.7 MG/DL (ref 8.4–10.5)
CALCIUM SERPL-MCNC: 8.7 MG/DL (ref 8.4–10.5)
CHLORIDE SERPL-SCNC: 98 MMOL/L (ref 98–110)
CHLORIDE SERPL-SCNC: 98 MMOL/L (ref 98–110)
CO2 SERPL-SCNC: 27 MMOL/L (ref 20–32)
CO2 SERPL-SCNC: 27 MMOL/L (ref 20–32)
CREAT SERPL-MCNC: 0.7 MG/DL (ref 0.8–1.6)
CREAT SERPL-MCNC: 0.7 MG/DL (ref 0.8–1.6)
EOSINOPHIL # BLD: 0.2 K/UL (ref 0–0.5)
EOSINOPHIL NFR BLD: 5 % (ref 0–6)
ERYTHROCYTE [DISTWIDTH] IN BLOOD BY AUTOMATED COUNT: 14.2 % (ref 10–15.5)
FOLATE,FOL: 13.2 NG/ML
GLOBULIN,GLOB: 3.9 G/DL (ref 2–4)
GLOBULIN,GLOB: 3.9 G/DL (ref 2–4)
GLOMERULAR FILTRATION RATE: >60 ML/MIN/1.73 SQ.M.
GLOMERULAR FILTRATION RATE: >60 ML/MIN/1.73 SQ.M.
GLUCOSE SERPL-MCNC: 91 MG/DL (ref 70–99)
GLUCOSE SERPL-MCNC: 93 MG/DL (ref 70–99)
GRANULOCYTES,GRANS: 41 % (ref 40–75)
HCT VFR BLD AUTO: 34.2 % (ref 39.3–51.6)
HGB BLD-MCNC: 12.1 G/DL (ref 13.1–17.2)
IMMATURE PLATELET FRACTION: 7.4 % (ref 1.1–7.1)
LYMPHOCYTES, LYMLT: 41 % (ref 20–45)
Lab: ABNORMAL
MCH RBC QN AUTO: 35 PG (ref 26–34)
MCHC RBC AUTO-ENTMCNC: 35 G/DL (ref 31–36)
MCV RBC AUTO: 98 FL (ref 80–95)
MONOCYTES # BLD: 0.6 K/UL (ref 0.1–1)
MONOCYTES NFR BLD: 12 % (ref 3–12)
NEUTROPHILS # BLD AUTO: 1.9 K/UL (ref 1.8–7.7)
NORMACHROMIC RBC, 868: ABNORMAL
NORMACYTIC RBC, 869: ABNORMAL
PLATELET # BLD AUTO: 90 K/UL (ref 140–440)
PMV BLD AUTO: 10.8 FL (ref 9–13)
POTASSIUM SERPL-SCNC: 3.4 MMOL/L (ref 3.5–5.5)
POTASSIUM SERPL-SCNC: 3.4 MMOL/L (ref 3.5–5.5)
PROT SERPL-MCNC: 7.2 G/DL (ref 6.2–8.1)
PROT SERPL-MCNC: 7.3 G/DL (ref 6.2–8.1)
RBC # BLD AUTO: 3.48 M/UL (ref 3.8–5.8)
SMEAR EVAL, 1131: ABNORMAL
SODIUM SERPL-SCNC: 133 MMOL/L (ref 133–145)
SODIUM SERPL-SCNC: 134 MMOL/L (ref 133–145)
VIT B12 SERPL-MCNC: 1454 PG/ML (ref 211–911)
WBC # BLD AUTO: 4.5 K/UL (ref 4–11)

## 2022-11-10 RX ORDER — CYANOCOBALAMIN 1000 UG/ML
1000 INJECTION, SOLUTION INTRAMUSCULAR; SUBCUTANEOUS ONCE
Start: 2022-11-16 | End: 2022-11-16

## 2022-11-16 ENCOUNTER — HOSPITAL ENCOUNTER (OUTPATIENT)
Dept: INFUSION THERAPY | Age: 62
End: 2022-11-16

## 2022-11-16 ENCOUNTER — VIRTUAL VISIT (OUTPATIENT)
Dept: ONCOLOGY | Age: 62
End: 2022-11-16
Payer: MEDICARE

## 2022-11-16 DIAGNOSIS — D53.9 MACROCYTIC ANEMIA: Primary | ICD-10-CM

## 2022-11-16 DIAGNOSIS — D69.6 THROMBOCYTOPENIA (HCC): ICD-10-CM

## 2022-11-16 DIAGNOSIS — E53.8 B12 DEFICIENCY: ICD-10-CM

## 2022-11-16 DIAGNOSIS — D53.9 MACROCYTIC ANEMIA: ICD-10-CM

## 2022-11-16 PROCEDURE — 99442 PR PHYS/QHP TELEPHONE EVALUATION 11-20 MIN: CPT | Performed by: NURSE PRACTITIONER

## 2022-11-16 NOTE — PROGRESS NOTES
Lars Lowery is a 60 y.o. male, evaluated via audio-only technology on 11/16/2022 for Follow-up    PCP: Ruddy Alejo MD    Assessment & Plan:   B12 deficiency anemia  Macrocytic Anemia/ Chronic Anemia   Neutropenia  --Past medical history significant of thrombocytopenia and refractory anemia, questionable myelodysplastic syndrome. --Patient was being followed previously by Dr. Pippa Rosario who retired, last seen 1/2016.  --8/19/2021 CBC reported hemoglobin 10, hematocrit 30.3%, WBC 4.4, , and platelet 739,492.  --11/5/2021 CBC reported hemoglobin 13, hematocrit 37.4%, total WBC 2.8, ANC 0.8, platelet 854,215. ESR was 36, vitamin B12 262, , haptoglobin 139, iron saturation 38%, ferritin 131. SPEP/SHERMAN reported Polyclonal increase detected in one or more immunoglobulins. --At last visit we have discussed about potential BMBx if no clear etiology of cytopenia, he was reluactant for procedure. He reported he has transportation issues to [facility] for BMBx procedures. --His neutropenia and macrocytic anemia could be related to B12 deficiency. He was started on Vit B12 1000 mcg injection once weekly for one month followed by 1000 mcg monthly. --Today I have reviewed with the patient about recent lab reports.   --10/21/2022 WBC/ANC 4.5/1.9 H/H 12.1/34.2, MCV 98. Vit B12 1454, Folate 13.2.  --Patient admits he is consuming 14% wine every other day plus 2-3 cans of beer on weekends. Plan:  --We will discontinue Vitamin B12 injections   --Obtain Bone Marrow Biopsy due to persistent cytopenias  --Advised patient about the importance of alcohol cessation. Patient states he will try to decrease his use slowly. --Advised the patient to seek a prompt medical attention if any fevers, recurrent infections, skin rashes, or any concerns.    --Patient states he has follow up with his PCP next week due to his elevated liver enzymes and GI referral.  --Patient agreed with above plan and verbalized understanding    Subjective:   Mr. Maris Wallace is a most pleasant 57y.o. year old male who was evaluated via audio-only technology. He has a past medical history significant of thrombocytopenia and refractory anemia, questionable myelodysplastic syndrome. Patient was being followed by Dr. Juan Diego Shirley who retired, last seen 1/2016. Patient admits he is consuming 14% wine every other day plus 2-3 cans of beer on weekends. Patient states he has an appointment with his PCP next week due to his elevated liver enzymes and GI Referral. Otherwise he denies fevers, chills, night sweats, unintentional weight loss, skin lumps or bumps, acute bleeding or bruising issues. Denies headaches, acute vision changes, dizziness, chest pain, shortness of breath, palpitation, productive cough, nausea, vomiting, abdominal pain, altered bowel habits, dysuria, bone pain or back pain, new focal numbness or weakness. He does not have any other concerns or complaints to report at this time. Prior to Admission medications    Medication Sig Start Date End Date Taking? Authorizing Provider   chlorhexidine (PERIDEX) 0.12 % solution  7/5/22  Yes Provider, Historical   potassium chloride SR (KLOR-CON 8) 8 mEq tablet  3/29/22  Yes Provider, Historical   magnesium oxide (MAG-OX) 400 mg tablet Take 400 mg by mouth daily. Yes Provider, Historical   Calcium Carbonate-Vit D3-Min 600 mg calcium- 400 unit tab Take  by mouth. Yes Provider, Historical   zonisamide (ZONEGRAN) 100 mg capsule Take  by mouth daily. Yes Provider, Historical   levETIRAcetam (KEPPRA) 500 mg tablet Take  by mouth two (2) times a day. Yes Provider, Historical   esomeprazole (NEXIUM) 40 mg capsule Take  by mouth daily. Yes Provider, Historical   levothyroxine (SYNTHROID) 50 mcg tablet Take  by mouth Daily (before breakfast). Yes Provider, Historical   omega-3 fatty acids-vitamin e 1,000 mg cap Take 1 Capsule by mouth.    Yes Provider, Historical     Patient Active Problem List Diagnosis Code    Refractory anemia ([facility]) D46.4    MDS (myelodysplastic syndrome) ([facility]) D46.9    Vitamin B12 deficiency anemia D51.9       Review of Systems   Constitutional:  Negative for chills, diaphoresis, fever, malaise/fatigue and weight loss. Respiratory:  Negative for cough, hemoptysis, shortness of breath and wheezing. Cardiovascular:  Negative for chest pain, palpitations and leg swelling. Gastrointestinal:  Negative for abdominal pain, diarrhea, heartburn, nausea and vomiting. Genitourinary:  Negative for dysuria, frequency, hematuria and urgency. Musculoskeletal:  Negative for joint pain and myalgias. Skin:  Negative for itching and rash. Neurological:  Negative for dizziness, seizures, weakness and headaches. Psychiatric/Behavioral:  Negative for depression. The patient does not have insomnia. Patient-Reported Vitals 11/16/2022   Patient-Reported Weight 181   Patient-Reported Pulse -   Patient-Reported Systolic  -   Patient-Reported Diastolic -     Lab Results   Component Value Date/Time    WBC 4.5 10/21/2022 06:46 AM    HGB (POC) 11.0 (A) 02/12/2014 08:48 AM    HGB 12.1 (L) 10/21/2022 06:46 AM    HCT (POC) 33.5 (A) 02/12/2014 08:48 AM    HCT 34.2 (L) 10/21/2022 06:46 AM    PLATELET 90 (L) 75/82/3821 06:46 AM    MCV 98 (H) 10/21/2022 06:46 AM     Lab Results   Component Value Date/Time    Sodium 134 10/21/2022 06:48 AM    Potassium 3.4 (L) 10/21/2022 06:48 AM    Chloride 98 10/21/2022 06:48 AM    CO2 27 10/21/2022 06:48 AM    Anion gap 9.0 10/21/2022 06:48 AM    Glucose 91 10/21/2022 06:48 AM    BUN 11 10/21/2022 06:48 AM    Creatinine 0.7 (L) 10/21/2022 06:48 AM    BUN/Creatinine ratio 19 09/23/2022 12:00 AM    GFR est  11/05/2021 12:00 AM    GFR est non-AA 92 11/05/2021 12:00 AM    Calcium 8.7 10/21/2022 06:48 AM    Bilirubin, total 0.7 10/21/2022 06:48 AM    Alk.  phosphatase 150 (H) 10/21/2022 06:48 AM    Protein, total 7.3 10/21/2022 06:48 AM    Albumin 3.4 (L) 10/21/2022 06:48 AM    Globulin 3.9 10/21/2022 06:48 AM    A-G Ratio 0.9 (L) 10/21/2022 06:48 AM    ALT (SGPT) 102 (H) 10/21/2022 06:48 AM    AST (SGOT) 66 (H) 10/21/2022 06:48 AM     Lab Results   Component Value Date/Time    Vitamin B12 1,454 (H) 10/21/2022 06:46 AM    Folate 13.20 10/21/2022 06:46 AM     Lab Results   Component Value Date/Time    Reticulocyte count 1.2 08/03/2022 08:53 AM       Lucille Corrales, who was evaluated through a patient-initiated, synchronous (real-time) audio only encounter, and/or her healthcare decision maker, is aware that it is a billable service, with coverage as determined by his insurance carrier. He provided verbal consent to proceed: Yes. He has not had a related appointment within my department in the past 7 days or scheduled within the next 24 hours. TOTAL TIME: 19 minutes    Follow up in 2 months with results or sooner if indicated.     Orders Placed This Encounter    IR BX BONE MARROW DIAGNOSTIC     Standing Status:   Future     Standing Expiration Date:   12/16/2023     Order Specific Question:   Transport     Answer:   Ambulatory [1]    CBC WITH AUTOMATED DIFF     Standing Status:   Future     Standing Expiration Date:   09/14/8200    METABOLIC PANEL, COMPREHENSIVE     Standing Status:   Future     Standing Expiration Date:   11/17/2023    VITAMIN B12 & FOLATE     Standing Status:   Future     Standing Expiration Date:   11/16/2023    IRON PROFILE     Standing Status:   Future     Standing Expiration Date:   11/17/2023    FERRITIN     Standing Status:   Future     Standing Expiration Date:   11/16/2023        Joel Zavala DNP, FNP-C  11/16/22      CC: Shonna Estrella MD

## 2022-11-25 ENCOUNTER — APPOINTMENT (OUTPATIENT)
Dept: INFUSION THERAPY | Age: 62
End: 2022-11-25

## 2022-12-14 ENCOUNTER — APPOINTMENT (OUTPATIENT)
Dept: INFUSION THERAPY | Age: 62
End: 2022-12-14

## 2022-12-23 ENCOUNTER — APPOINTMENT (OUTPATIENT)
Dept: INFUSION THERAPY | Age: 62
End: 2022-12-23

## 2023-01-20 ENCOUNTER — HOSPITAL ENCOUNTER (OUTPATIENT)
Dept: INTERVENTIONAL RADIOLOGY/VASCULAR | Age: 63
Discharge: HOME | End: 2023-01-20
Attending: NURSE PRACTITIONER | Admitting: RADIOLOGY
Payer: MEDICARE

## 2023-01-20 VITALS
HEART RATE: 63 BPM | SYSTOLIC BLOOD PRESSURE: 144 MMHG | OXYGEN SATURATION: 100 % | WEIGHT: 178 LBS | RESPIRATION RATE: 13 BRPM | BODY MASS INDEX: 22.13 KG/M2 | DIASTOLIC BLOOD PRESSURE: 93 MMHG | HEIGHT: 75 IN

## 2023-01-20 LAB
ANION GAP SERPL CALC-SCNC: 4 MMOL/L (ref 3–18)
APTT PPP: 29 SEC (ref 23–36.4)
BUN SERPL-MCNC: 9 MG/DL (ref 7–18)
BUN/CREAT SERPL: 9 (ref 12–20)
CALCIUM SERPL-MCNC: 8.6 MG/DL (ref 8.5–10.1)
CHLORIDE SERPL-SCNC: 105 MMOL/L (ref 100–111)
CO2 SERPL-SCNC: 27 MMOL/L (ref 21–32)
CREAT SERPL-MCNC: 0.99 MG/DL (ref 0.6–1.3)
ERYTHROCYTE [DISTWIDTH] IN BLOOD BY AUTOMATED COUNT: 16 % (ref 11.6–14.5)
GLUCOSE SERPL-MCNC: 101 MG/DL (ref 74–99)
HCT VFR BLD AUTO: 29.8 % (ref 36–48)
HGB BLD-MCNC: 10.5 G/DL (ref 13–16)
INR PPP: 1.2 (ref 0.8–1.2)
MCH RBC QN AUTO: 37.4 PG (ref 24–34)
MCHC RBC AUTO-ENTMCNC: 35.2 G/DL (ref 31–37)
MCV RBC AUTO: 106 FL (ref 78–100)
NRBC # BLD: 0 K/UL (ref 0–0.01)
NRBC BLD-RTO: 0 PER 100 WBC
PLATELET # BLD AUTO: 126 K/UL (ref 135–420)
PMV BLD AUTO: 9.9 FL (ref 9.2–11.8)
POTASSIUM SERPL-SCNC: 3.6 MMOL/L (ref 3.5–5.5)
PROTHROMBIN TIME: 15.4 SEC (ref 11.5–15.2)
RBC # BLD AUTO: 2.81 M/UL (ref 4.35–5.65)
SODIUM SERPL-SCNC: 136 MMOL/L (ref 136–145)
WBC # BLD AUTO: 5.5 K/UL (ref 4.6–13.2)

## 2023-01-20 PROCEDURE — 80048 BASIC METABOLIC PNL TOTAL CA: CPT

## 2023-01-20 PROCEDURE — 88311 DECALCIFY TISSUE: CPT

## 2023-01-20 PROCEDURE — 85730 THROMBOPLASTIN TIME PARTIAL: CPT

## 2023-01-20 PROCEDURE — 88264 CHROMOSOME ANALYSIS 20-25: CPT

## 2023-01-20 PROCEDURE — 88185 FLOWCYTOMETRY/TC ADD-ON: CPT

## 2023-01-20 PROCEDURE — 74011000250 HC RX REV CODE- 250: Performed by: RADIOLOGY

## 2023-01-20 PROCEDURE — 88305 TISSUE EXAM BY PATHOLOGIST: CPT

## 2023-01-20 PROCEDURE — 88184 FLOWCYTOMETRY/ TC 1 MARKER: CPT

## 2023-01-20 PROCEDURE — 88237 TISSUE CULTURE BONE MARROW: CPT

## 2023-01-20 PROCEDURE — 85610 PROTHROMBIN TIME: CPT

## 2023-01-20 PROCEDURE — 38221 DX BONE MARROW BIOPSIES: CPT

## 2023-01-20 PROCEDURE — 85027 COMPLETE CBC AUTOMATED: CPT

## 2023-01-20 PROCEDURE — 88313 SPECIAL STAINS GROUP 2: CPT

## 2023-01-20 PROCEDURE — 74011250636 HC RX REV CODE- 250/636: Performed by: RADIOLOGY

## 2023-01-20 RX ORDER — FENTANYL CITRATE 50 UG/ML
12.5-1 INJECTION, SOLUTION INTRAMUSCULAR; INTRAVENOUS
Status: DISCONTINUED | OUTPATIENT
Start: 2023-01-20 | End: 2023-01-20 | Stop reason: HOSPADM

## 2023-01-20 RX ORDER — LIDOCAINE HYDROCHLORIDE 10 MG/ML
30 INJECTION, SOLUTION EPIDURAL; INFILTRATION; INTRACAUDAL; PERINEURAL ONCE
Status: COMPLETED | OUTPATIENT
Start: 2023-01-20 | End: 2023-01-20

## 2023-01-20 RX ORDER — SODIUM CHLORIDE 9 MG/ML
20 INJECTION, SOLUTION INTRAVENOUS CONTINUOUS
Status: DISCONTINUED | OUTPATIENT
Start: 2023-01-20 | End: 2023-01-20 | Stop reason: HOSPADM

## 2023-01-20 RX ORDER — MIDAZOLAM HYDROCHLORIDE 1 MG/ML
.5-2 INJECTION, SOLUTION INTRAMUSCULAR; INTRAVENOUS
Status: DISCONTINUED | OUTPATIENT
Start: 2023-01-20 | End: 2023-01-20 | Stop reason: HOSPADM

## 2023-01-20 RX ORDER — NALOXONE HYDROCHLORIDE 0.4 MG/ML
0.2 INJECTION, SOLUTION INTRAMUSCULAR; INTRAVENOUS; SUBCUTANEOUS AS NEEDED
Status: DISCONTINUED | OUTPATIENT
Start: 2023-01-20 | End: 2023-01-20 | Stop reason: HOSPADM

## 2023-01-20 RX ORDER — SODIUM CHLORIDE 9 MG/ML
75 INJECTION, SOLUTION INTRAVENOUS CONTINUOUS
Status: DISCONTINUED | OUTPATIENT
Start: 2023-01-20 | End: 2023-01-20 | Stop reason: HOSPADM

## 2023-01-20 RX ORDER — FLUMAZENIL 0.1 MG/ML
0.2 INJECTION INTRAVENOUS AS NEEDED
Status: DISCONTINUED | OUTPATIENT
Start: 2023-01-20 | End: 2023-01-20 | Stop reason: HOSPADM

## 2023-01-20 RX ADMIN — LIDOCAINE HYDROCHLORIDE 30 ML: 10 INJECTION, SOLUTION EPIDURAL; INFILTRATION; INTRACAUDAL; PERINEURAL at 09:55

## 2023-01-20 RX ADMIN — MIDAZOLAM 1 MG: 1 INJECTION INTRAMUSCULAR; INTRAVENOUS at 10:00

## 2023-01-20 RX ADMIN — FENTANYL CITRATE 50 MCG: 50 INJECTION INTRAMUSCULAR; INTRAVENOUS at 10:00

## 2023-01-20 RX ADMIN — MIDAZOLAM 1 MG: 1 INJECTION INTRAMUSCULAR; INTRAVENOUS at 09:55

## 2023-01-20 RX ADMIN — FENTANYL CITRATE 50 MCG: 50 INJECTION INTRAMUSCULAR; INTRAVENOUS at 09:55

## 2023-01-20 NOTE — ROUTINE PROCESS
1/20/2023            RE: Katerina Euceda         [facility], [place], operated by [facility] [id number]              To Whom It May Concern,      Due to medical reasons, Katerina Euceda may  remain out of work until Tuesday January 24th, 2023. Please feel free to contact [phone] with any questions.         Sincerely,          Alexus Ray RN

## 2023-01-20 NOTE — PROGRESS NOTES
TRANSFER - OUT REPORT:    Verbal report given to TEDDY Robles(name) on Anh Wong  being transferred to cath holding(unit) for routine post - op       Report consisted of patients Situation, Background, Assessment and   Recommendations(SBAR). Information from the following report(s) SBAR, Procedure Summary, and MAR was reviewed with the receiving nurse. Lines:   Peripheral IV 01/20/23 Left Brachial (Active)   Site Assessment Clean, dry, & intact 01/20/23 0755   Phlebitis Assessment 0 01/20/23 0755   Dressing Status Clean, dry, & intact 01/20/23 0755   Dressing Type Transparent 01/20/23 0755   Hub Color/Line Status Pink 01/20/23 0755   Alcohol Cap Used No 01/20/23 0755        Opportunity for questions and clarification was provided.       Patient transported with:   Registered Nurse

## 2023-01-20 NOTE — H&P
History and Physical    Patient: Leigh Finnegan           Sex: male       DOA: 1/20/2023  YOB: 1960      Age:  60 y.o.     LOS:  LOS: 0 days        HPI:     Leigh Finnegan is a 60 y.o. male with a h/o neutropenia and refractory anemia in B12 deficiency who presents for bone marrow aspiration and biopsy. Past Medical History:   Diagnosis Date    Anemia     GERD (gastroesophageal reflux disease)     Seizures (HCC)     Stool color black     Thyroid disease        No past surgical history on file. No family history on file. Social History     Socioeconomic History    Marital status:    Tobacco Use    Smoking status: Some Days    Smokeless tobacco: Never    Tobacco comments:     while drinking   Substance and Sexual Activity    Alcohol use: Yes     Alcohol/week: 4.2 standard drinks     Types: 5 Cans of beer per week     Comment: 4-5 beers a few times a week    Drug use: No       Prior to Admission medications    Medication Sig Start Date End Date Taking? Authorizing Provider   levETIRAcetam (KEPPRA) 500 mg tablet Take  by mouth two (2) times a day. Yes Provider, Historical   esomeprazole (NEXIUM) 40 mg capsule Take  by mouth daily. Yes Provider, Historical   levothyroxine (SYNTHROID) 50 mcg tablet Take  by mouth Daily (before breakfast). Yes Provider, Historical   chlorhexidine (PERIDEX) 0.12 % solution  7/5/22   Provider, Historical   potassium chloride SR (KLOR-CON 8) 8 mEq tablet  3/29/22   Provider, Historical   magnesium oxide (MAG-OX) 400 mg tablet Take 400 mg by mouth daily. Patient not taking: Reported on 1/20/2023    Provider, Historical   Calcium Carbonate-Vit D3-Min 600 mg calcium- 400 unit tab Take  by mouth. Patient not taking: Reported on 1/20/2023    Provider, Historical   zonisamide (ZONEGRAN) 100 mg capsule Take  by mouth daily.   Patient not taking: Reported on 1/20/2023    Provider, Historical   omega-3 fatty acids-vitamin e 1,000 mg cap Take 1 Capsule by mouth. Patient not taking: Reported on 1/20/2023    Provider, Historical       No Known Allergies    Physical Exam:      There were no vitals taken for this visit. Physical Exam:  Mallampati 2 ASA 2  General: A&O x 4, NAD  Heart: RRR  Lungs: Normal work of breathing on room air    Labs Reviewed: All lab results for the last 24 hours reviewed.     Assessment/Plan     The patient is an appropriate candidate to undergo the planned procedure and sedation    BRYN Zapien

## 2023-01-20 NOTE — DISCHARGE INSTRUCTIONS
Bone Marrow Aspiration and Biopsy: What to Expect at Home  Your Recovery  The biopsy site may feel sore for several days. You may have a bruise on the site. It can help to walk, take pain medicine, and put ice packs on the site. You will probably be able to return to work and your usual activities the day after the procedure. Your doctor or nurse will call you with the results of your test.  This care sheet gives you a general idea about how long it will take for you to recover. But each person recovers at a different pace. Follow the steps below to get better as quickly as possible. How can you care for yourself at home? Activity    Rest when you feel tired. Getting enough sleep will help you recover. You may drive when you are no longer taking pain pills and can quickly move your foot from the gas pedal to the brake. You must also be able to sit comfortably for a long period of time, even if you do not plan to go far. You might get caught in traffic. Most people are able to return to work the day after the procedure. Medicines    Your doctor will tell you if and when you can restart your medicines. He or she will also give you instructions about taking any new medicines. If you stopped taking aspirin or some other blood thinner, your doctor will tell you when to start taking it again. Be safe with medicines. Take pain medicines exactly as directed. If the doctor gave you a prescription medicine for pain, take it as prescribed. If you are not taking a prescription pain medicine, take an over-the-counter medicine such as acetaminophen (Tylenol), ibuprofen (Advil, Motrin), or naproxen (Aleve). Read and follow all instructions on the label. Do not take two or more pain medicines at the same time unless the doctor told you to. Many pain medicines have acetaminophen, which is Tylenol. Too much acetaminophen (Tylenol) can be harmful.      If you think your pain medicine is making you sick to your stomach: Take your medicine after meals (unless your doctor has told you not to). Ask your doctor for a different pain medicine. If your doctor prescribed antibiotics, take them as directed. Do not stop taking them just because you feel better. Ice    Put ice or a cold pack on the biopsy site for 10 to 20 minutes at a time. Put a thin cloth between the ice and your skin. Follow-up care is a key part of your treatment and safety. Be sure to make and go to all appointments, and call your doctor if you are having problems. It's also a good idea to know your test results and keep a list of the medicines you take. When should you call for help? Call 911 anytime you think you may need emergency care. For example, call if:    You passed out (lost consciousness). Call your doctor now or seek immediate medical care if:    You have signs of infection, such as: Increased pain, swelling, warmth, or redness. Red streaks leading from the biopsy site. Pus draining from the biopsy site. Swollen lymph nodes in your neck, armpits, or groin. A fever. Watch closely for any changes in your health, and be sure to contact your doctor if:    You are not getting better as expected. Where can you learn more? Go to http://www.gray.com/  Enter E148 in the search box to learn more about \"Bone Marrow Aspiration and Biopsy: What to Expect at Home. \"  Current as of: May 4, 2022               Content Version: 13.4  © 4187-7570 [employer]. Care instructions adapted under license by [employer] (which disclaims liability or warranty for this information). If you have questions about a medical condition or this instruction, always ask your healthcare professional. [place] [address] any warranty or liability for your use of this information.

## 2023-01-20 NOTE — PROCEDURES
RADIOLOGY POST PROCEDURE NOTE     January 20, 2023       10:39 AM     Preoperative Diagnosis:   Anemia. Postoperative Diagnosis:  Same. :  Dr. Fabian Bachelor    Assistant:  None. Type of Anesthesia: 1% local lidocaine and IV moderate sedation with Versed and fentanyl. Procedure/Description: Image guided bone marrow biopsy. Findings:   No bleeding. .    Estimated blood Loss: Minimal    Specimen Removed:   Yes    Blood transfusions:  None. Implants:  None. Complications: None    Condition: Stable    Discharge Plan: Discharged home in stable and no bleeding. Resume blood thinners if any in 24 hours.     Chel Anton MD

## 2023-02-17 LAB
A/G RATIO: 0.9 RATIO (ref 1.1–2.6)
ALBUMIN SERPL-MCNC: 3.4 G/DL (ref 3.5–5)
ALP BLD-CCNC: 189 U/L (ref 40–125)
ALT SERPL-CCNC: 66 U/L (ref 5–40)
ANION GAP SERPL CALCULATED.3IONS-SCNC: 12 MMOL/L (ref 3–15)
AST SERPL-CCNC: 88 U/L (ref 10–37)
BASOPHILS # BLD: 2 % (ref 0–2)
BASOPHILS ABSOLUTE: 0.1 K/UL (ref 0–0.2)
BILIRUB SERPL-MCNC: 0.7 MG/DL (ref 0.2–1.2)
BUN BLDV-MCNC: 13 MG/DL (ref 6–22)
CALCIUM SERPL-MCNC: 8.7 MG/DL (ref 8.4–10.5)
CHLORIDE BLD-SCNC: 100 MMOL/L (ref 98–110)
CO2: 23 MMOL/L (ref 20–32)
CREAT SERPL-MCNC: 0.7 MG/DL (ref 0.8–1.6)
EOSINOPHIL # BLD: 3 % (ref 0–6)
EOSINOPHILS ABSOLUTE: 0.1 K/UL (ref 0–0.5)
FERRITIN: 912 NG/ML (ref 22–322)
GLOBULIN: 4 G/DL (ref 2–4)
GLOMERULAR FILTRATION RATE: >60 ML/MIN/1.73 SQ.M.
GLUCOSE: 102 MG/DL (ref 70–99)
HCT VFR BLD CALC: 31.4 % (ref 39.3–51.6)
HEMOGLOBIN: 11.4 G/DL (ref 13.1–17.2)
IRON % SATURATION: 31 % (ref 20–50)
IRON: 68 MCG/DL (ref 45–160)
LYMPHOCYTES # BLD: 34 % (ref 20–45)
LYMPHOCYTES ABSOLUTE: 1.5 K/UL (ref 1–4.8)
MACROCYTOSIS: ABNORMAL
MCH RBC QN AUTO: 39 PG (ref 26–34)
MCHC RBC AUTO-ENTMCNC: 36 G/DL (ref 31–36)
MCV RBC AUTO: 108 FL (ref 80–95)
MONOCYTES ABSOLUTE: 0.7 K/UL (ref 0.1–1)
MONOCYTES: 15 % (ref 3–12)
NEUTROPHILS ABSOLUTE: 2.1 K/UL (ref 1.8–7.7)
NEUTROPHILS: 47 % (ref 40–75)
NORMACHROMIC RBC: ABNORMAL
PDW BLD-RTO: 13.3 % (ref 10–15.5)
PLATELET # BLD: 98 K/UL (ref 140–440)
PLATELET, IMMATURE FRACTION: 6 % (ref 1.1–7.1)
PMV BLD AUTO: 10.5 FL (ref 9–13)
POTASSIUM SERPL-SCNC: 3.6 MMOL/L (ref 3.5–5.5)
RBC: 2.92 M/UL (ref 3.8–5.8)
SMEAR REVIEW: ABNORMAL
SODIUM BLD-SCNC: 135 MMOL/L (ref 133–145)
TOTAL IRON BINDING CAPACITY: 220 MCG/DL (ref 228–428)
TOTAL PROTEIN: 7.4 G/DL (ref 6.2–8.1)
UIBC: 152 MCG/DL (ref 110–370)
VITAMIN B-12: 946 PG/ML (ref 211–911)
WBC: 4.5 K/UL (ref 4–11)

## 2023-02-20 ENCOUNTER — TELEPHONE (OUTPATIENT)
Age: 63
End: 2023-02-20

## 2023-03-13 ENCOUNTER — TELEPHONE (OUTPATIENT)
Age: 63
End: 2023-03-13

## 2023-03-13 NOTE — TELEPHONE ENCOUNTER
Patient contacted office to report is his office in person or virtual. Explained to patient his appt needs to be in person. He is going to contact and schedule his transportation now.

## 2023-03-17 ENCOUNTER — OFFICE VISIT (OUTPATIENT)
Age: 63
End: 2023-03-17
Payer: MEDICARE

## 2023-03-17 VITALS
OXYGEN SATURATION: 97 % | WEIGHT: 179 LBS | HEIGHT: 75 IN | SYSTOLIC BLOOD PRESSURE: 166 MMHG | RESPIRATION RATE: 18 BRPM | HEART RATE: 85 BPM | BODY MASS INDEX: 22.26 KG/M2 | DIASTOLIC BLOOD PRESSURE: 107 MMHG

## 2023-03-17 DIAGNOSIS — D69.6 THROMBOCYTOPENIA, UNSPECIFIED (HCC): Primary | ICD-10-CM

## 2023-03-17 DIAGNOSIS — D53.9 MACROCYTIC ANEMIA: ICD-10-CM

## 2023-03-17 PROCEDURE — G8427 DOCREV CUR MEDS BY ELIG CLIN: HCPCS | Performed by: INTERNAL MEDICINE

## 2023-03-17 PROCEDURE — 4004F PT TOBACCO SCREEN RCVD TLK: CPT | Performed by: INTERNAL MEDICINE

## 2023-03-17 PROCEDURE — G8484 FLU IMMUNIZE NO ADMIN: HCPCS | Performed by: INTERNAL MEDICINE

## 2023-03-17 PROCEDURE — 99213 OFFICE O/P EST LOW 20 MIN: CPT | Performed by: INTERNAL MEDICINE

## 2023-03-17 PROCEDURE — 99212 OFFICE O/P EST SF 10 MIN: CPT | Performed by: INTERNAL MEDICINE

## 2023-03-17 PROCEDURE — G8420 CALC BMI NORM PARAMETERS: HCPCS | Performed by: INTERNAL MEDICINE

## 2023-03-17 PROCEDURE — 3017F COLORECTAL CA SCREEN DOC REV: CPT | Performed by: INTERNAL MEDICINE

## 2023-03-17 RX ORDER — LEVOTHYROXINE SODIUM 0.2 MG/1
TABLET ORAL
COMMUNITY
Start: 2023-01-15

## 2023-03-17 ASSESSMENT — PATIENT HEALTH QUESTIONNAIRE - PHQ9
SUM OF ALL RESPONSES TO PHQ QUESTIONS 1-9: 0
1. LITTLE INTEREST OR PLEASURE IN DOING THINGS: 0
SUM OF ALL RESPONSES TO PHQ QUESTIONS 1-9: 0
2. FEELING DOWN, DEPRESSED OR HOPELESS: 0
SUM OF ALL RESPONSES TO PHQ QUESTIONS 1-9: 0
SUM OF ALL RESPONSES TO PHQ QUESTIONS 1-9: 0
SUM OF ALL RESPONSES TO PHQ9 QUESTIONS 1 & 2: 0

## 2023-03-17 NOTE — PROGRESS NOTES
Hematology/Oncology Note      Date: 3/17/2023    Name: Pete Zhang  : 1960     Rufino Ferrer MD      Subjective:     Chief complaint: Thrombocytopenia, Anemia     History of Present Illness:   Mr. Zhang is a most pleasant 62 y.o. year old male who was seen for consultation of Thrombocytopenia, Anemia .   The patient reported doing fairly well. The patient otherwise has no other complaints. Denied fever, chills, night sweat, unintentional weight loss, skin lumps or bumps, acute bleeding or bruising issues. Denied headache, acute vision change, dizziness, chest pain, worsen shortness of breath, palpitation, productive cough, nausea, vomiting, abdominal pain, altered bowel habits, dysuria, worsen bone pain or back pain, new focal numbness or weakness.       Past Medical History, Family History, and Social History:    Past Medical History:   Diagnosis Date    Anemia     GERD (gastroesophageal reflux disease)     Seizures (HCC)     Stool color black     Thyroid disease      Past Surgical History:   Procedure Laterality Date    IR BIOPSY PERC SUPERF BONE  2023    IR BIOPSY PERC SUPERF BONE 2023 [facility] RAD ANGIO IR    IR BIOPSY PERC SUPERF BONE  2023     Social History     Tobacco Use    Smoking status: Some Days    Smokeless tobacco: Never    Tobacco comments:     Quit smoking: while drinking   Substance Use Topics    Alcohol use: Yes     Alcohol/week: 4.2 standard drinks    Drug use: No      History reviewed. No pertinent family history.  Current Outpatient Medications   Medication Sig Dispense Refill    levothyroxine (SYNTHROID) 200 MCG tablet       chlorhexidine (PERIDEX) 0.12 % solution ceived the following from [facility]: Outside name: chlorhexidine (PERIDEX) 0.12 % solution      esomeprazole (NEXIUM) 40 MG delayed release capsule Take by mouth daily      levETIRAcetam (KEPPRA) 500 MG tablet Take by mouth 2 times daily      levothyroxine (SYNTHROID) 50 MCG tablet Take by  mouth every morning (before breakfast)      magnesium oxide (MAG-OX) 400 MG tablet Take 400 mg by mouth daily      potassium chloride (KLOR-CON) 8 MEQ extended release tablet ceived the following from [facility]: Outside name: potassium chloride SR (KLOR-CON 8) 8 mEq tablet      zonisamide (ZONEGRAN) 100 MG capsule Take by mouth daily       No current facility-administered medications for this visit. Review of Systems   Constitutional:  Negative for fatigue and fever. Respiratory:  Negative for cough and shortness of breath. Cardiovascular:  Negative for chest pain. Gastrointestinal:  Negative for abdominal pain, diarrhea, nausea and vomiting. Genitourinary:  Negative for flank pain. Musculoskeletal:  Negative for back pain, joint swelling and neck pain. Skin:  Negative for rash. Neurological:  Negative for syncope, weakness, numbness and headaches. Hematological:  Negative for adenopathy. Psychiatric/Behavioral:  Negative for behavioral problems. Objective:   BP (!) 166/107   Pulse 85   Resp 18   Ht 6' 3\" (1.905 m)   Wt 179 lb (81.2 kg)   SpO2 97%   BMI 22.37 kg/m²     ECOG Performance Status (grade): 1  0 - able to carry on all pre-disease activity w/out restriction  1 - restricted but able to carry out light work  2 - ambulatory and can self- care but unable to carry out work  3 - bed or chair >50% of waking hours  4 - completely disable, total care, confined to bed or chair    Physical Exam  Constitutional:       Appearance: Normal appearance. He is not ill-appearing. HENT:      Head: Normocephalic and atraumatic. Cardiovascular:      Rate and Rhythm: Normal rate and regular rhythm. Pulses: Normal pulses. Heart sounds: Normal heart sounds. Pulmonary:      Effort: Pulmonary effort is normal.      Breath sounds: Normal breath sounds. Abdominal:      General: Bowel sounds are normal.      Palpations: Abdomen is soft. Tenderness:  There is no abdominal tenderness. There is no guarding. Musculoskeletal:         General: No swelling or tenderness. Cervical back: Neck supple. Skin:     General: Skin is warm. Findings: No erythema or rash. Neurological:      General: No focal deficit present. Mental Status: He is alert and oriented to person, place, and time. Mental status is at baseline. Psychiatric:         Behavior: Behavior normal.            Diagnostics:      No results found for this or any previous visit (from the past 96 hour(s)). Imaging:  No valid procedures specified. No results found for this or any previous visit. No results found for this or any previous visit. Diagnosis:                                        1. Thrombocytopenia, unspecified ([facility] [address]    2. Macrocytic anemia        Assessment and Plan:                                        Macrocytic Anemia/ Chronic Anemia   Thrombocytopenia  Hx B12 deficiency anemia  --Past medical history significant of thrombocytopenia and refractory anemia, questionable myelodysplastic syndrome. --Patient was being followed previously by Dr. Laz Belcher who retired, last seen 1/2016.  -- 7/22/2021 Abd US reported hepatomegaly with increased hepatic echogenicity suggestive of hepatocellular disease/steatosis. --8/19/2021 CBC reported hemoglobin 10, hematocrit 30.3%, WBC 4.4, , and platelet 393,008.  --11/5/2021 CBC reported hemoglobin 13, hematocrit 37.4%, total WBC 2.8, ANC 0.8, platelet 182,388. ESR was 36, vitamin B12 262, , haptoglobin 139, iron saturation 38%, ferritin 131. SPEP/SAUD reported Polyclonal increase detected in one or more immunoglobulins. --At last visit we have discussed about potential BMBx if no clear etiology of cytopenia, he was reluactant for procedure. He reported he has transportation issues to [facility] for BMBx procedures. --His neutropenia and macrocytic anemia could be related to B12 deficiency.  He was started on Vit B12 1000 mcg injection once weekly for one month followed by 1000 mcg monthly.   --10/21/2022 WBC/ANC 4.5/1.9 H/H 12.1/34.2, MCV 98. Vit B12 1454, Folate 13.2.  --Patient admits he is consuming 14% wine every other day plus 2-3 cans of beer on weekends. --Today I have reviewed with the patient about recent lab reports. 1/20/23 Bone Marrow Biopsy: no evidence of impaired erythropoiesis or megakaryopoiesis within   the bone marrow. There is no evidence of bone marrow involvement by myeloid neoplasm. The etiology of the patient's anemia and thrombocytopenia is unclear based on bone marrow findings. -- His chronic anemia and thrombocytopenia was still unclear etiology, likely ACD and hepatocellular disease related thrombocytopenia. Clinical stable without bleeding. Plan:  --Monitor CBC, Iron profile, ferritin, Vitamin B12/Folate periodically  --Advised patient about the importance of alcohol cessation. Patient states he will try to decrease his use slowly. --Advised the patient to seek a prompt medical attention if any fevers, recurrent infections, skin rashes, or any concerns. --Patient states he has follow up with his PCP for his elevated liver enzymes and [facility] referral.  --Patient agreed with above plan and verbalized understanding  -- We will see the patient back in about 4 months, Always sooner if required. No orders of the defined types were placed in this encounter. Mr. Ralph Lombard has a reminder for a \"due or due soon\" health maintenance. I have asked that he contact his primary care provider for follow-up on this health maintenance. All of patient's questions answered to their apparent satisfaction. They verbally show understanding and agreement with aforementioned plan.          Janet Whitman MD  3/17/2023        Above mentioned total time spent for this encounter with more than 50% of the time spent in face-to-face counseling, discussing on diagnosis and management plan going forward, and co-ordination of care. Parts of this document has been produced using Dragon dictation system. Unrecognized errors in transcription may be present. Please do not hesitate to reach out for any questions or clarifications.       CC: Karen Sweet MD,

## 2023-03-19 ASSESSMENT — ENCOUNTER SYMPTOMS
VOMITING: 0
COUGH: 0
NAUSEA: 0
SHORTNESS OF BREATH: 0
ABDOMINAL PAIN: 0
BACK PAIN: 0
DIARRHEA: 0

## 2023-07-20 DIAGNOSIS — D53.9 MACROCYTIC ANEMIA: ICD-10-CM

## 2023-07-20 DIAGNOSIS — D70.9 NEUTROPENIA, UNSPECIFIED TYPE (HCC): ICD-10-CM

## 2023-07-20 DIAGNOSIS — E53.8 DEFICIENCY OF OTHER SPECIFIED B GROUP VITAMINS: ICD-10-CM

## 2023-07-20 DIAGNOSIS — D53.9 NUTRITIONAL ANEMIA, UNSPECIFIED: ICD-10-CM

## 2023-07-20 DIAGNOSIS — D69.6 THROMBOCYTOPENIA, UNSPECIFIED (HCC): Primary | ICD-10-CM
